# Patient Record
Sex: FEMALE | Race: WHITE | Employment: FULL TIME | URBAN - METROPOLITAN AREA
[De-identification: names, ages, dates, MRNs, and addresses within clinical notes are randomized per-mention and may not be internally consistent; named-entity substitution may affect disease eponyms.]

---

## 2016-10-04 LAB
CREATININE, EXTERNAL: 0.94
LDL-C, EXTERNAL: 112

## 2017-05-30 ENCOUNTER — OFFICE VISIT (OUTPATIENT)
Dept: ENDOCRINOLOGY | Age: 37
End: 2017-05-30

## 2017-05-30 VITALS
HEIGHT: 64 IN | HEART RATE: 77 BPM | WEIGHT: 183.5 LBS | DIASTOLIC BLOOD PRESSURE: 88 MMHG | OXYGEN SATURATION: 99 % | SYSTOLIC BLOOD PRESSURE: 130 MMHG | BODY MASS INDEX: 31.33 KG/M2 | TEMPERATURE: 97.3 F | RESPIRATION RATE: 18 BRPM

## 2017-05-30 DIAGNOSIS — E28.2 PCOS (POLYCYSTIC OVARIAN SYNDROME): Primary | ICD-10-CM

## 2017-05-30 DIAGNOSIS — E06.3 HASHIMOTO'S DISEASE: ICD-10-CM

## 2017-05-30 DIAGNOSIS — E53.8 VITAMIN B12 DEFICIENCY: ICD-10-CM

## 2017-05-30 DIAGNOSIS — E55.9 VITAMIN D DEFICIENCY: ICD-10-CM

## 2017-05-30 DIAGNOSIS — R73.9 HYPERGLYCEMIA: ICD-10-CM

## 2017-05-30 RX ORDER — NORETHINDRONE 5 MG/1
5 TABLET ORAL DAILY
COMMUNITY
End: 2019-07-15

## 2017-05-30 RX ORDER — LEVETIRACETAM 500 MG/1
TABLET ORAL 2 TIMES DAILY
COMMUNITY
End: 2021-02-24

## 2017-05-30 NOTE — MR AVS SNAPSHOT
Visit Information Date & Time Provider Department Dept. Phone Encounter #  
 5/30/2017  1:45 PM Yovanny Mueller MD [facility] Diabetes & Endocrinology [phone] [id number] Follow-up Instructions Return in about 6 months (around 11/30/2017). Upcoming Health Maintenance Date Due DTaP/Tdap/Td series (1 - Tdap) 10/14/2003 PAP AKA CERVICAL CYTOLOGY 10/14/2003 INFLUENZA AGE 9 TO ADULT 8/1/2017 Allergies as of 5/30/2017  Review Complete On: 5/30/2017 By: Yovanny Mueller MD  
 No Known Allergies Current Immunizations  Never Reviewed No immunizations on file. Not reviewed this visit You Were Diagnosed With   
  
 Codes Comments PCOS (polycystic ovarian syndrome)    -  Primary ICD-10-CM: E28.2 ICD-9-CM: 256.4 Hashimoto's disease     ICD-10-CM: E06.3 ICD-9-CM: 245.2 Hyperglycemia     ICD-10-CM: R73.9 ICD-9-CM: 790.29 Vitals BP Pulse Temp Resp Height(growth percentile) Weight(growth percentile) 130/88 (BP 1 Location: Right arm, BP Patient Position: Sitting) 77 97.3 °F (36.3 °C) (Oral) 18 5' 4\" (1.626 m) 183 lb 8 oz (83.2 kg) SpO2 BMI OB Status Smoking Status 99% 31.5 kg/m2 Having regular periods Never Smoker Vitals History BMI and BSA Data Body Mass Index Body Surface Area 31.5 kg/m 2 1.94 m 2 Preferred Pharmacy Pharmacy Name Phone [id number], [address] [facility] [phone] Your Updated Medication List  
  
   
This list is accurate as of: 5/30/17  2:39 PM.  Always use your most recent med list.  
  
  
  
  
 fenofibrate 160 mg tablet Commonly known as:  LOFIBRA Take 1 tablet by mouth daily. levETIRAcetam 500 mg tablet Commonly known as:  KEPPRA Take  by mouth two (2) times a day. levothyroxine 100 mcg tablet Commonly known as:  SYNTHROID Take 1 Tab by mouth Daily (before breakfast). Stop 88 mcg  
  
 losartan 100 mg tablet Commonly known as:  COZAAR  
TAKE 1 TABLET (100 MG) BY ORAL ROUTE ONCE DAILY FOR 30 DAYS  
  
 metFORMIN 500 mg tablet Commonly known as:  GLUCOPHAGE Take 1 Tab by mouth two (2) times daily (with meals). norethindrone acetate 5 mg tablet Commonly known as:  AYGESTIN Take 5 mg by mouth daily. venlafaxine 75 mg tablet Commonly known as:  EFFEXOR  
TAKE 1 TABLET (75 MG) BY ORAL ROUTE 2 TIMES PER DAY WITH FOOD FOR 30 DAYS Follow-up Instructions Return in about 6 months (around 11/30/2017). Introducing [facility] & HEALTH SERVICES! Dear Danica Calle: Thank you for requesting a c[employer] account. Our records indicate that you have previously registered for a cinvolve account but its currently inactive. Please call our cinvolve support line at [phone]. Additional Information If you have questions, please visit the Frequently Asked Questions section of the cinvolve website at [email or web address] Remember, cinvolve is NOT to be used for urgent needs. For medical emergencies, dial 911. Now available from your iPhone and Android! Please provide this summary of care documentation to your next provider. Your primary care clinician is listed as Jeane Steele. If you have any questions after today's visit, please call [phone].

## 2017-05-30 NOTE — PROGRESS NOTES
History of present illness    Dia Yoo is a 31 y.o. female  here for fu of  Hypothyroidism and PCOS    She is on LT4 consistently     Reports tiredness, insomnia  No weight gain     On progesterone only pill - has cycles monthly   On Metformin      No change in the size of the neck or neck pain. No dysphagia,dysphonia or dyspnea. No constipation or cold intolerance/heat intolerance  No history of known radiation exposure    No FH of thyroid disease. No FH of thyroid cancer     Past Medical History:   Diagnosis Date    Hashimoto's disease     Hypothyroid     PCOS (polycystic ovarian syndrome)     Seizure ([address] 12/2016     Wt Readings from Last 3 Encounters:   05/30/17 183 lb 8 oz (83.2 kg)   11/29/16 183 lb (83 kg)   10/25/16 177 lb 4.8 oz (80.4 kg)     Current Outpatient Prescriptions   Medication Sig    norethindrone acetate (AYGESTIN) 5 mg tablet Take 5 mg by mouth daily.  levETIRAcetam (KEPPRA) 500 mg tablet Take  by mouth two (2) times a day.  levothyroxine (SYNTHROID) 100 mcg tablet Take 1 Tab by mouth Daily (before breakfast). Stop 88 mcg    losartan (COZAAR) 100 mg tablet TAKE 1 TABLET (100 MG) BY ORAL ROUTE ONCE DAILY FOR 30 DAYS    venlafaxine (EFFEXOR) 75 mg tablet TAKE 1 TABLET (75 MG) BY ORAL ROUTE 2 TIMES PER DAY WITH FOOD FOR 30 DAYS    metFORMIN (GLUCOPHAGE) 500 mg tablet Take 1 Tab by mouth two (2) times daily (with meals).  fenofibrate (TRICOR) 160 mg tablet Take 1 tablet by mouth daily. No current facility-administered medications for this visit.           Review of Systems:  - Eyes: no blurry vision or double vision  - Cardiovascular: no chest pain or palpitations  - Respiratory: no cough or shortness of breath  - Gastrointestinal: no dysphagia or abdominal pain  - Musculoskeletal: no joint pains  - Integumentary: no rashes  - Neurological: no numbness, tingling, or headaches  -     Physical Examination:  - Blood pressure 130/88, pulse 77, temperature 97.3 °F (36.3 °C), temperature source Oral, resp. rate 18, height 5' 4\" (1.626 m), weight 183 lb 8 oz (83.2 kg), SpO2 99 %. Body mass index is 31.5 kg/(m^2). - General: pleasant, no distress, good eye contact  - HEENT: no periorbital edema, no scleral/conjunctival injection, EOMI, no lid lag or stare  - Neck: supple, no thyromegaly  - Cardiovascular: regular, normal rate, normal S1 and S2  - Respiratory: clear  - Gastrointestinal: soft, nontender, BS +  - Musculoskeletal:  no tremors, no edema  - Neurological: alert and oriented   - Psychiatric: normal mood and affect  - Skin - normal turgor    Data Reviewed:   1/13 TSH 21  Prolactin 14  FSH 5.5    Lab Results   Component Value Date/Time    TSH 2.960 05/23/2017 01:34 PM      Component      Latest Ref Rng 11/5/2013 9/9/2013   Luteinizing hormone        9.7   FSH        5.0   TSH      0.450 - 4.500 uIU/mL 3.660 0.189 (L)   Testosterone, Serum (Total)      10.0 - 55.0 ng/dL  44.0   Prolactin      4.8 - 23.3 ng/mL  9.8   Estradiol        46.0   DHEA Sulfate      98.8 - 340.0 ug/dL  129.2     Lab Results   Component Value Date/Time    Hemoglobin A1c 5.8 05/13/2015 10:36 AM       [x] Reviewed labs    Assessment/Plan:     1. PCOS (polycystic ovarian syndrome)    2. Hashimoto's disease    3. Hyperglycemia    4. Vitamin B12 deficiency    5. Vitamin D deficiency        Primary hypothyroidism /Hashimoto's thyroditis  Euthyroid. Annual neck exam.  US - no nodules 7/14  On LT4    HTN - controlled    PCOS /Prediabetes-  Hx of DVT - no OCPs    Metformin  Off Aldactone  On Provera      Insomnia - sleep hygeine    Hypertriglyceridemia - on tricor    Seizures - on keppra    Thank you for allowing me to participate in the care of this patient.     Abbie Landau, MD

## 2017-06-04 DIAGNOSIS — E06.3 HASHIMOTO'S DISEASE: ICD-10-CM

## 2017-06-04 RX ORDER — LEVOTHYROXINE SODIUM 100 UG/1
TABLET ORAL
Qty: 30 TAB | Refills: 5 | Status: SHIPPED | OUTPATIENT
Start: 2017-06-04 | End: 2017-12-05 | Stop reason: SDUPTHER

## 2017-11-29 LAB
25(OH)D3+25(OH)D2 SERPL-MCNC: 22.8 NG/ML (ref 30–100)
EST. AVERAGE GLUCOSE BLD GHB EST-MCNC: 120 MG/DL
HBA1C MFR BLD: 5.8 % (ref 4.8–5.6)
TSH SERPL DL<=0.005 MIU/L-ACNC: 5.11 UIU/ML (ref 0.45–4.5)
VIT B12 SERPL-MCNC: 319 PG/ML (ref 211–946)

## 2017-12-05 ENCOUNTER — OFFICE VISIT (OUTPATIENT)
Dept: ENDOCRINOLOGY | Age: 37
End: 2017-12-05

## 2017-12-05 VITALS
HEART RATE: 102 BPM | WEIGHT: 195.5 LBS | OXYGEN SATURATION: 99 % | DIASTOLIC BLOOD PRESSURE: 99 MMHG | RESPIRATION RATE: 14 BRPM | HEIGHT: 64 IN | BODY MASS INDEX: 33.38 KG/M2 | TEMPERATURE: 98.5 F | SYSTOLIC BLOOD PRESSURE: 148 MMHG

## 2017-12-05 DIAGNOSIS — E06.3 HYPOTHYROIDISM DUE TO HASHIMOTO'S THYROIDITIS: ICD-10-CM

## 2017-12-05 DIAGNOSIS — E28.2 PCOS (POLYCYSTIC OVARIAN SYNDROME): ICD-10-CM

## 2017-12-05 DIAGNOSIS — R73.03 PREDIABETES: ICD-10-CM

## 2017-12-05 DIAGNOSIS — E03.8 HYPOTHYROIDISM DUE TO HASHIMOTO'S THYROIDITIS: ICD-10-CM

## 2017-12-05 DIAGNOSIS — E06.3 HASHIMOTO'S DISEASE: Primary | ICD-10-CM

## 2017-12-05 DIAGNOSIS — E06.3 HASHIMOTO'S DISEASE: ICD-10-CM

## 2017-12-05 DIAGNOSIS — E28.2 PCOS (POLYCYSTIC OVARIAN SYNDROME): Primary | ICD-10-CM

## 2017-12-05 RX ORDER — LEVOTHYROXINE SODIUM 100 UG/1
TABLET ORAL
Qty: 34 TAB | Refills: 5 | Status: SHIPPED | OUTPATIENT
Start: 2017-12-05 | End: 2018-06-14 | Stop reason: SDUPTHER

## 2017-12-05 RX ORDER — METFORMIN HYDROCHLORIDE 500 MG/1
500 TABLET, EXTENDED RELEASE ORAL DAILY
Qty: 30 TAB | Refills: 5 | Status: SHIPPED | OUTPATIENT
Start: 2017-12-05 | End: 2019-07-15

## 2017-12-05 NOTE — MR AVS SNAPSHOT
Visit Information Date & Time Provider Department Dept. Phone Encounter #  
 12/5/2017  1:45 PM Cindy Lopes MD [facility] Diabetes & Endocrinology [phone] [id number] Follow-up Instructions Return in about 6 months (around 6/5/2018). Upcoming Health Maintenance Date Due DTaP/Tdap/Td series (1 - Tdap) 10/14/2003 PAP AKA CERVICAL CYTOLOGY 10/14/2003 Influenza Age 5 to Adult 8/1/2017 Allergies as of 12/5/2017  Review Complete On: 12/5/2017 By: Cindy Lopes MD  
 No Known Allergies Current Immunizations  Never Reviewed No immunizations on file. Not reviewed this visit You Were Diagnosed With   
  
 Codes Comments Hashimoto's disease    -  Primary ICD-10-CM: E06.3 ICD-9-CM: 245.2 PCOS (polycystic ovarian syndrome)     ICD-10-CM: E28.2 ICD-9-CM: 256.4 Vitals BP Pulse Temp Resp Height(growth percentile) Weight(growth percentile) (!) 148/99 (BP 1 Location: Left arm, BP Patient Position: Sitting) (!) 102 98.5 °F (36.9 °C) (Oral) 14 5' 4\" (1.626 m) 195 lb 8 oz (88.7 kg) SpO2 BMI OB Status Smoking Status 99% 33.56 kg/m2 Having regular periods Never Smoker Vitals History BMI and BSA Data Body Mass Index Body Surface Area  
 33.56 kg/m 2 2 m 2 Preferred Pharmacy Pharmacy Name Phone [id number], [address] [phone] Your Updated Medication List  
  
   
This list is accurate as of: 12/5/17  2:13 PM.  Always use your most recent med list.  
  
  
  
  
 fenofibrate 160 mg tablet Commonly known as:  LOFIBRA Take 1 tablet by mouth daily. levETIRAcetam 500 mg tablet Commonly known as:  KEPPRA Take  by mouth two (2) times a day. levothyroxine 100 mcg tablet Commonly known as:  SYNTHROID  
TAKE 1 TAB BY MOUTH DAILY (BEFORE BREAKFAST). STOP 88 MCG  
  
 losartan 100 mg tablet Commonly known as:  COZAAR  
 TAKE 1 TABLET (100 MG) BY ORAL ROUTE ONCE DAILY FOR 30 DAYS  
  
 metFORMIN 500 mg tablet Commonly known as:  GLUCOPHAGE Take 1 Tab by mouth two (2) times daily (with meals). norethindrone acetate 5 mg tablet Commonly known as:  AYGESTIN Take 5 mg by mouth daily. venlafaxine 75 mg tablet Commonly known as:  EFFEXOR  
TAKE 1 TABLET (37.5 MG) QHS Follow-up Instructions Return in about 6 months (around 6/5/2018). Patient Instructions Gluten-Free Diet: Care Instructions Your Care Instructions To help your symptoms, your doctor has recommended a gluten-free diet. This means not eating foods that have gluten in them. Gluten is a kind of protein. It's found in wheat, barley, and rye. If you eat a gluten-free diet, you can help manage your symptoms and prevent long-term problems. You can also get all the nutrition you need. Follow-up care is a key part of your treatment and safety. Be sure to make and go to all appointments, and call your doctor if you are having problems. It's also a good idea to know your test results and keep a list of the medicines you take. How can you care for yourself at home? · Don't eat any foods that have gluten in them. These include bagels, bread, crackers, and some cereals. They also include pasta and pizza. · Carefully read food labels. Look for wheat or wheat products in ice cream and candy. You may also find them in salad dressing, canned and frozen soups and vegetables, and other processed foods. · Avoid all beer products unless the label says they are gluten-free. Beers with and without alcohol have gluten unless the labels say they are gluten-free. This includes lagers, ales, and stouts. · Avoid oats, at least at first. Oats may cause symptoms in some people, perhaps as a result of contamination with wheat, barley, or rye during processing.  But many people who have celiac disease can eat moderate amounts of oats without having symptoms. Health professionals vary in their long-term recommendations regarding eating foods with oats. But most agree it is safe to eat oats labeled as gluten-free. · When you eat out, look for restaurants that serve gluten-free food. You can also ask if the  is familiar with gluten-free cooking. · Try to learn more about gluten-free options. Find grocery stores that sell gluten-free pizza and other foods. If you have access to the Internet, look online for gluten-free foods and recipes. · On a gluten-free eating plan, it's okay to have: 
¨ Eggs and dairy products. (But some dairy products may make your symptoms worse. Ask your doctor if you have questions about dairy products. Read ingredient labels carefully. Some processed cheeses contain gluten.) ¨ Flours and foods made with amaranth, arrowroot, beans, buckwheat, corn, cornmeal, flax, millet, potatoes, gluten-free nut and oat bran, quinoa, rice, sorghum, soybeans, tapioca, or teff. ¨ Fresh, frozen, or canned unprocessed meats. But avoid processed meats. Some examples of processed meats to avoid are hot dogs, salami, and deli meat. Read labels for additives that may contain gluten. ¨ Fresh, frozen, dried, or canned fruits and vegetables, if they do not have thickeners or other additives that contain gluten. ¨ Some alcohol drinks. These include wine, liqueurs, and ciders. They also include liquor like whiskey and nathaniel. When should you call for help? Watch closely for changes in your health, and be sure to contact your doctor if: 
? · You have unexplained weight loss. ? · You have diarrhea that lasts longer than 1 to 2 weeks. ? · You have unusual fatigue or mood changes, especially if these last more than a week and are not related to any other illness, such as the flu. ? · Your symptoms come back again. ? · Your stomach pain gets worse. Where can you learn more? Go to http://jesús-gloria.info/. Enter 31 41 19 in the search box to learn more about \"Gluten-Free Diet: Care Instructions. \" Current as of: May 12, 2017 Content Version: 11.4 © 1797-5473 [employer]. Care instructions adapted under license by [employer] (which disclaims liability or warranty for this information). If you have questions about a medical condition or this instruction, always ask your healthcare professional. [address] any warranty or liability for your use of this information. Introducing [facility] & HEALTH SERVICES! Dear Nana Kehr: Thank you for requesting a [employer] account. Our records indicate that you have previously registered for a [employer] account but its currently inactive. Please call our [employer] support line at [phone]. Additional Information If you have questions, please visit the Frequently Asked Questions section of the [employer] website at [email or web address] Remember, [employer] is NOT to be used for urgent needs. For medical emergencies, dial 911. Now available from your iPhone and Android! Please provide this summary of care documentation to your next provider. Your primary care clinician is listed as Danis Lujan. If you have any questions after today's visit, please call [phone].

## 2017-12-05 NOTE — PATIENT INSTRUCTIONS
Gluten-Free Diet: Care Instructions  Your Care Instructions    To help your symptoms, your doctor has recommended a gluten-free diet. This means not eating foods that have gluten in them. Gluten is a kind of protein. It's found in wheat, barley, and rye. If you eat a gluten-free diet, you can help manage your symptoms and prevent long-term problems. You can also get all the nutrition you need. Follow-up care is a key part of your treatment and safety. Be sure to make and go to all appointments, and call your doctor if you are having problems. It's also a good idea to know your test results and keep a list of the medicines you take. How can you care for yourself at home? · Don't eat any foods that have gluten in them. These include bagels, bread, crackers, and some cereals. They also include pasta and pizza. · Carefully read food labels. Look for wheat or wheat products in ice cream and candy. You may also find them in salad dressing, canned and frozen soups and vegetables, and other processed foods. · Avoid all beer products unless the label says they are gluten-free. Beers with and without alcohol have gluten unless the labels say they are gluten-free. This includes lagers, ales, and stouts. · Avoid oats, at least at first. Oats may cause symptoms in some people, perhaps as a result of contamination with wheat, barley, or rye during processing. But many people who have celiac disease can eat moderate amounts of oats without having symptoms. Health professionals vary in their long-term recommendations regarding eating foods with oats. But most agree it is safe to eat oats labeled as gluten-free. · When you eat out, look for restaurants that serve gluten-free food. You can also ask if the  is familiar with gluten-free cooking. · Try to learn more about gluten-free options. Find grocery stores that sell gluten-free pizza and other foods.  If you have access to the Internet, look online for gluten-free foods and recipes. · On a gluten-free eating plan, it's okay to have:  ¨ Eggs and dairy products. (But some dairy products may make your symptoms worse. Ask your doctor if you have questions about dairy products. Read ingredient labels carefully. Some processed cheeses contain gluten.)  ¨ Flours and foods made with amaranth, arrowroot, beans, buckwheat, corn, cornmeal, flax, millet, potatoes, gluten-free nut and oat bran, quinoa, rice, sorghum, soybeans, tapioca, or teff. ¨ Fresh, frozen, or canned unprocessed meats. But avoid processed meats. Some examples of processed meats to avoid are hot dogs, salami, and deli meat. Read labels for additives that may contain gluten. ¨ Fresh, frozen, dried, or canned fruits and vegetables, if they do not have thickeners or other additives that contain gluten. ¨ Some alcohol drinks. These include wine, liqueurs, and ciders. They also include liquor like whiskey and nathaniel. When should you call for help? Watch closely for changes in your health, and be sure to contact your doctor if:  ? · You have unexplained weight loss. ? · You have diarrhea that lasts longer than 1 to 2 weeks. ? · You have unusual fatigue or mood changes, especially if these last more than a week and are not related to any other illness, such as the flu. ? · Your symptoms come back again. ? · Your stomach pain gets worse. Where can you learn more? Go to http://jesús-gloria.info/. Enter 31 41 19 in the search box to learn more about \"Gluten-Free Diet: Care Instructions. \"  Current as of: May 12, 2017  Content Version: 11.4  © 9772-5298 [employer]. Care instructions adapted under license by [employer] (which disclaims liability or warranty for this information).  If you have questions about a medical condition or this instruction, always ask your healthcare professional. [place] [address] any warranty or liability for your use of this information.

## 2017-12-05 NOTE — PROGRESS NOTES
Urban Flores is a 30 y.o. female here for   Chief Complaint   Patient presents with    PCOS    Thyroid Problem       1. Have you been to the ER, urgent care clinic since your last visit? Hospitalized since your last visit? - no    2. Have you seen or consulted any other health care providers outside of the [address] [place] [address] since your last visit? Include any pap smears or colon screening. -PCP    Wt Readings from Last 3 Encounters:   05/30/17 183 lb 8 oz (83.2 kg)   11/29/16 183 lb (83 kg)   10/25/16 177 lb 4.8 oz (80.4 kg)     Temp Readings from Last 3 Encounters:   05/30/17 97.3 °F (36.3 °C) (Oral)   11/29/16 97.9 °F (36.6 °C) (Oral)   10/25/16 97.8 °F (36.6 °C) (Oral)     BP Readings from Last 3 Encounters:   05/30/17 130/88   11/29/16 114/73   10/25/16 139/89     Pulse Readings from Last 3 Encounters:   05/30/17 77   11/29/16 87   10/25/16 76

## 2017-12-05 NOTE — PROGRESS NOTES
History of present illness    Luzma Choi is a 30 y.o. female  here for fu of  Hypothyroidism and PCOS    She is on LT4 consistently     Reports tiredness  Diet is unhealthy and admits to drinking too many sodas   Insomnia at night . On progesterone only pill - has cycles monthly   off Metformin due to GI side effects     No history of known radiation exposure    No FH of thyroid disease. No FH of thyroid cancer     Wt Readings from Last 3 Encounters:   12/05/17 195 lb 8 oz (88.7 kg)   05/30/17 183 lb 8 oz (83.2 kg)   11/29/16 183 lb (83 kg)       Past Medical History:   Diagnosis Date    Hashimoto's disease     Hypothyroid     PCOS (polycystic ovarian syndrome)     Seizure ([address] 12/2016     Wt Readings from Last 3 Encounters:   12/05/17 195 lb 8 oz (88.7 kg)   05/30/17 183 lb 8 oz (83.2 kg)   11/29/16 183 lb (83 kg)     Current Outpatient Prescriptions   Medication Sig    levothyroxine (SYNTHROID) 100 mcg tablet TAKE 1 TAB BY MOUTH DAILY (BEFORE BREAKFAST). STOP 88 MCG    norethindrone acetate (AYGESTIN) 5 mg tablet Take 5 mg by mouth daily.  levETIRAcetam (KEPPRA) 500 mg tablet Take  by mouth two (2) times a day.  losartan (COZAAR) 100 mg tablet TAKE 1 TABLET (100 MG) BY ORAL ROUTE ONCE DAILY FOR 30 DAYS    venlafaxine (EFFEXOR) 75 mg tablet TAKE 1 TABLET (37.5 MG) QHS    metFORMIN (GLUCOPHAGE) 500 mg tablet Take 1 Tab by mouth two (2) times daily (with meals).  fenofibrate (TRICOR) 160 mg tablet Take 1 tablet by mouth daily. No current facility-administered medications for this visit.           Review of Systems:  - Eyes: no blurry vision or double vision  - Cardiovascular: no chest pain or palpitations  - Respiratory: no cough or shortness of breath  - Gastrointestinal: no dysphagia or abdominal pain  - Musculoskeletal: no joint pains  - Integumentary: no rashes  - Neurological: no numbness, tingling, or headaches  -     Physical Examination:  - Blood pressure (!) 148/99, pulse (!) 102, temperature 98.5 °F (36.9 °C), temperature source Oral, resp. rate 14, height 5' 4\" (1.626 m), weight 195 lb 8 oz (88.7 kg), SpO2 99 %. Body mass index is 33.56 kg/(m^2). - General: pleasant, no distress, good eye contact  - HEENT: no periorbital edema, no scleral/conjunctival injection, EOMI, no lid lag or stare  - Neck: supple, no thyromegaly  - Cardiovascular: regular, normal rate, normal S1 and S2  - Respiratory: clear  - Gastrointestinal: soft, nontender, BS +  - Musculoskeletal:  no tremors, no edema  - Neurological: alert and oriented   - Psychiatric: normal mood and affect  - Skin - normal turgor    Data Reviewed:   1/13 TSH 21  Prolactin 14  FSH 5.5    Lab Results   Component Value Date/Time    TSH 5.110 11/28/2017 12:00 AM      Component      Latest Ref Rng 11/5/2013 9/9/2013   Luteinizing hormone        9.7   FSH        5.0   TSH      0.450 - 4.500 uIU/mL 3.660 0.189 (L)   Testosterone, Serum (Total)      10.0 - 55.0 ng/dL  44.0   Prolactin      4.8 - 23.3 ng/mL  9.8   Estradiol        46.0   DHEA Sulfate      98.8 - 340.0 ug/dL  129.2     Lab Results   Component Value Date/Time    Hemoglobin A1c 5.8 11/28/2017 12:00 AM       [x] Reviewed labs    Assessment/Plan:     1. Hashimoto's disease    2. PCOS (polycystic ovarian syndrome)        Primary hypothyroidism /Hashimoto's thyroditis    US - no nodules 7/14  On LT4 - compliance discussed    HTN - controlled    PCOS /Prediabetes-  Hx of DVT - no OCPs    Metformin - diarrhea, change to ER  Off Aldactone  On Provera        Insomnia - sleep hygeine, quit sodas     Hypertriglyceridemia - on tricor    Seizures - on keppra    Thank you for allowing me to participate in the care of this patient.     Alma Glass MD

## 2018-02-19 ENCOUNTER — OFFICE VISIT (OUTPATIENT)
Dept: ENDOCRINOLOGY | Age: 38
End: 2018-02-19

## 2018-02-19 VITALS
RESPIRATION RATE: 14 BRPM | WEIGHT: 190.7 LBS | HEART RATE: 93 BPM | DIASTOLIC BLOOD PRESSURE: 108 MMHG | HEIGHT: 64 IN | TEMPERATURE: 97.4 F | BODY MASS INDEX: 32.56 KG/M2 | SYSTOLIC BLOOD PRESSURE: 154 MMHG | OXYGEN SATURATION: 99 %

## 2018-02-19 DIAGNOSIS — E06.3 HYPOTHYROIDISM DUE TO HASHIMOTO'S THYROIDITIS: ICD-10-CM

## 2018-02-19 DIAGNOSIS — E03.8 HYPOTHYROIDISM DUE TO HASHIMOTO'S THYROIDITIS: ICD-10-CM

## 2018-02-19 DIAGNOSIS — E28.2 PCOS (POLYCYSTIC OVARIAN SYNDROME): ICD-10-CM

## 2018-02-19 DIAGNOSIS — K21.9 GASTROESOPHAGEAL REFLUX DISEASE WITHOUT ESOPHAGITIS: ICD-10-CM

## 2018-02-19 DIAGNOSIS — E06.3 HASHIMOTO'S DISEASE: Primary | ICD-10-CM

## 2018-02-19 RX ORDER — LIDOCAINE HYDROCHLORIDE 20 MG/ML
SOLUTION ORAL; TOPICAL
COMMUNITY
Start: 2018-02-16 | End: 2020-11-17

## 2018-02-19 RX ORDER — ALUMINA, MAGNESIA, AND SIMETHICONE 2400; 2400; 240 MG/30ML; MG/30ML; MG/30ML
10 SUSPENSION ORAL
Qty: 150 ML | Refills: 0 | Status: SHIPPED | OUTPATIENT
Start: 2018-02-19 | End: 2018-08-27

## 2018-02-19 RX ORDER — RANITIDINE 150 MG/1
150 TABLET, FILM COATED ORAL 2 TIMES DAILY
Qty: 60 TAB | Refills: 5 | Status: SHIPPED | OUTPATIENT
Start: 2018-02-19 | End: 2018-08-27

## 2018-02-19 NOTE — PROGRESS NOTES
History of present illness    Elli Ledesma is a 30 y.o. female  here for fu of  Hypothyroidism and PCOS    She is on LT4 consistently   She complains of foreign body sensation, painful swallowing, sore throat, she was in the ER was negative. Chronic smoker, recently stopped sodas  No fever,  chills,   No sick contacts  No recent steroids  Reports tiredness    On progesterone only pill - has cycles monthly   off Metformin due to GI side effects     No history of known radiation exposure    No FH of thyroid disease. No FH of thyroid cancer     Wt Readings from Last 3 Encounters:   02/19/18 190 lb 11.2 oz (86.5 kg)   12/05/17 195 lb 8 oz (88.7 kg)   05/30/17 183 lb 8 oz (83.2 kg)       Past Medical History:   Diagnosis Date    Hashimoto's disease     Hypothyroid     PCOS (polycystic ovarian syndrome)     Seizure ([facility] [address] 12/2016     Wt Readings from Last 3 Encounters:   02/19/18 190 lb 11.2 oz (86.5 kg)   12/05/17 195 lb 8 oz (88.7 kg)   05/30/17 183 lb 8 oz (83.2 kg)     Current Outpatient Prescriptions   Medication Sig    levothyroxine (SYNTHROID) 100 mcg tablet TAKE 1 TAB BY MOUTH DAILY (BEFORE BREAKFAST) MON-FRI. TAKE 2 TABS ON SUNDAYS. STOP 88 MCG    metFORMIN ER (GLUCOPHAGE XR) 500 mg tablet Take 1 Tab by mouth daily.  norethindrone acetate (AYGESTIN) 5 mg tablet Take 5 mg by mouth daily.  levETIRAcetam (KEPPRA) 500 mg tablet Take  by mouth two (2) times a day.  losartan (COZAAR) 100 mg tablet TAKE 1 TABLET (100 MG) BY ORAL ROUTE ONCE DAILY FOR 30 DAYS    venlafaxine (EFFEXOR) 75 mg tablet TAKE 1 TABLET (37.5 MG) QHS    LIDOCAINE VISCOUS 2 % solution     fenofibrate (TRICOR) 160 mg tablet Take 1 tablet by mouth daily. No current facility-administered medications for this visit.           Review of Systems:  - Eyes: no blurry vision or double vision  - Cardiovascular: no chest pain or palpitations  - Respiratory: no cough or shortness of breath  - Gastrointestinal: + Dysphagia or abdominal pain  - Musculoskeletal: no joint pains  - Integumentary: no rashes  - Neurological: no numbness, tingling, or headaches  -     Physical Examination:  - Blood pressure (!) 154/108, pulse 93, temperature 97.4 °F (36.3 °C), temperature source Oral, resp. rate 14, height 5' 4\" (1.626 m), weight 190 lb 11.2 oz (86.5 kg), SpO2 99 %. Body mass index is 32.73 kg/(m^2). - General: pleasant, no distress, good eye contact  - HEENT: no periorbital edema, no scleral/conjunctival injection, EOMI, no lid lag or stare  - Neck:  could not palpate thyromegaly, severe gag reflex  - Cardiovascular: regular, normal rate, normal S1 and S2  - Respiratory: clear  - Gastrointestinal: soft, nontender, BS +  - Musculoskeletal:  no tremors, no edema  - Neurological: alert and oriented   - Psychiatric: normal mood and affect  - Skin - normal turgor    Data Reviewed:   1/13 TSH 21  Prolactin 14  FSH 5.5    Lab Results   Component Value Date/Time    TSH 5.110 (H) 11/28/2017 12:00 AM      Component      Latest Ref Rng 11/5/2013 9/9/2013   Luteinizing hormone        9.7   FSH        5.0   TSH      0.450 - 4.500 uIU/mL 3.660 0.189 (L)   Testosterone, Serum (Total)      10.0 - 55.0 ng/dL  44.0   Prolactin      4.8 - 23.3 ng/mL  9.8   Estradiol        46.0   DHEA Sulfate      98.8 - 340.0 ug/dL  129.2     Lab Results   Component Value Date/Time    Hemoglobin A1c 5.8 (H) 11/28/2017 12:00 AM       [x] Reviewed labs    Assessment/Plan:     1. Hashimoto's disease    2. Hypothyroidism due to Hashimoto's thyroiditis    3.  PCOS (polycystic ovarian syndrome)        Odynophagia:  Was evaluated in ER recently, strep negative  She had x-ray in the ER, may be looking for epiglottitis  GERD, thyroiditis  Maalox, Zantac  GI evaluation      Primary hypothyroidism /Hashimoto's thyroditis    US - no nodules 7/14  On LT4 - compliance discussed    HTN - uncontrolled    PCOS /Prediabetes-  Hx of DVT - no OCPs    Metformin - diarrhea, change to ER  Off Aldactone  On Provera        Insomnia - sleep hygeine, quit sodas     Hypertriglyceridemia - on tricor    Seizures - on keppra    Thank you for allowing me to participate in the care of this patient.     Katie Givens MD

## 2018-02-19 NOTE — MR AVS SNAPSHOT
[address] [facility] [address] [place] [address] 
[phone] Patient: Iam He MRN: [id number] :1980 Visit Information Date & Time Provider Department Dept. Phone Encounter #  
 2018  1:30 PM Julia Velez MD [facility] [phone] [id number] Follow-up Instructions Return in about 6 months (around 2018). Your Appointments 2018 10:30 AM  
LAB with CDE NURSE [facility]) Appt Note: lab only [address] [facility] [address] [facility] [address]  
[phone]  
  
   
 [address] [id number]  
  
    
 2018  1:45 PM  
ROUTINE CARE with Julia Velez MD  
[facility]) Appt Note: f/u 6 month  
 [address] [place] [address]  
[phone]  
  
   
 [address] [place] [address] Upcoming Health Maintenance Date Due DTaP/Tdap/Td series (1 - Tdap) 10/14/2003 PAP AKA CERVICAL CYTOLOGY 10/14/2003 Influenza Age 5 to Adult 2017 Allergies as of 2018  Review Complete On: 2018 By: Julia Velez MD  
 No Known Allergies Current Immunizations  Never Reviewed No immunizations on file. Not reviewed this visit You Were Diagnosed With   
  
 Codes Comments Hashimoto's disease    -  Primary ICD-10-CM: E06.3 ICD-9-CM: 245.2 Hypothyroidism due to Hashimoto's thyroiditis     ICD-10-CM: E03.8, E06.3 ICD-9-CM: 244.8, 245.2 PCOS (polycystic ovarian syndrome)     ICD-10-CM: E28.2 ICD-9-CM: 256.4 Vitals BP Pulse Temp Resp Height(growth percentile) Weight(growth percentile) (!) 154/108 (BP 1 Location: Right arm, BP Patient Position: Sitting) 93 97.4 °F (36.3 °C) (Oral) 14 5' 4\" (1.626 m) 190 lb 11.2 oz (86.5 kg) SpO2 BMI OB Status Smoking Status 99% 32.73 kg/m2 Having regular periods Never Smoker Vitals History BMI and BSA Data Body Mass Index Body Surface Area 32.73 kg/m 2 1.98 m 2 Preferred Pharmacy Pharmacy Name Phone [id number] [address] [facility] [phone] Your Updated Medication List  
  
   
This list is accurate as of: 2/19/18  2:05 PM.  Always use your most recent med list.  
  
  
  
  
 fenofibrate 160 mg tablet Commonly known as:  LOFIBRA Take 1 tablet by mouth daily. levETIRAcetam 500 mg tablet Commonly known as:  KEPPRA Take  by mouth two (2) times a day. levothyroxine 100 mcg tablet Commonly known as:  SYNTHROID  
TAKE 1 TAB BY MOUTH DAILY (BEFORE BREAKFAST) MON-FRI. TAKE 2 TABS ON SUNDAYS. STOP 88 MCG  
  
 LIDOCAINE VISCOUS 2 % solution Generic drug:  lidocaine  
  
 losartan 100 mg tablet Commonly known as:  COZAAR  
TAKE 1 TABLET (100 MG) BY ORAL ROUTE ONCE DAILY FOR 30 DAYS  
  
 metFORMIN  mg tablet Commonly known as:  GLUCOPHAGE XR Take 1 Tab by mouth daily. norethindrone acetate 5 mg tablet Commonly known as:  AYGESTIN Take 5 mg by mouth daily. venlafaxine 75 mg tablet Commonly known as:  EFFEXOR  
TAKE 1 TABLET (37.5 MG) QHS Follow-up Instructions Return in about 6 months (around 8/19/2018). Introducing [facility]! Dear Beni Posada: Thank you for requesting a RxRev[employer] account. Our records indicate that you have previously registered for a RxRevu account but its currently inactive. Please call our RxRevu support line at [phone]. Additional Information If you have questions, please visit the Frequently Asked Questions section of the RxRevu website at [email or web address] Remember, RxRevu is NOT to be used for urgent needs. For medical emergencies, dial 911. Now available from your iPhone and Android! Please provide this summary of care documentation to your next provider. Your primary care clinician is listed as Estlata Law. If you have any questions after today's visit, please call [phone].

## 2018-02-19 NOTE — PROGRESS NOTES
Clinton Gonzalez is a 30 y.o. female here for   Chief Complaint   Patient presents with    Thyroid Problem     1. Have you been to the ER, urgent care clinic since your last visit? Hospitalized since your last visit? -[place] on Friday for choking sensation    2. Have you seen or consulted any other health care providers outside of the [address] [place] [address] since your last visit?   Include any pap smears or colon screening.-no      Lab Results   Component Value Date/Time    Hemoglobin A1c 5.8 (H) 11/28/2017 12:00 AM       Wt Readings from Last 3 Encounters:   12/05/17 195 lb 8 oz (88.7 kg)   05/30/17 183 lb 8 oz (83.2 kg)   11/29/16 183 lb (83 kg)     Temp Readings from Last 3 Encounters:   12/05/17 98.5 °F (36.9 °C) (Oral)   05/30/17 97.3 °F (36.3 °C) (Oral)   11/29/16 97.9 °F (36.6 °C) (Oral)     BP Readings from Last 3 Encounters:   12/05/17 (!) 148/99   05/30/17 130/88   11/29/16 114/73     Pulse Readings from Last 3 Encounters:   12/05/17 (!) 102   05/30/17 77   11/29/16 87

## 2018-02-20 LAB
ERYTHROCYTE [SEDIMENTATION RATE] IN BLOOD BY WESTERGREN METHOD: 6 MM/HR (ref 0–32)
T4 FREE SERPL-MCNC: 1.6 NG/DL (ref 0.82–1.77)
TSH SERPL DL<=0.005 MIU/L-ACNC: 1.78 UIU/ML (ref 0.45–4.5)

## 2018-06-14 DIAGNOSIS — E06.3 HASHIMOTO'S DISEASE: ICD-10-CM

## 2018-06-15 RX ORDER — LEVOTHYROXINE SODIUM 100 UG/1
TABLET ORAL
Qty: 34 TAB | Refills: 5 | Status: SHIPPED | OUTPATIENT
Start: 2018-06-15 | End: 2018-12-03 | Stop reason: SDUPTHER

## 2018-08-27 ENCOUNTER — OFFICE VISIT (OUTPATIENT)
Dept: ENDOCRINOLOGY | Age: 38
End: 2018-08-27

## 2018-08-27 VITALS
RESPIRATION RATE: 14 BRPM | HEIGHT: 64 IN | TEMPERATURE: 97.5 F | WEIGHT: 194 LBS | BODY MASS INDEX: 33.12 KG/M2 | SYSTOLIC BLOOD PRESSURE: 156 MMHG | HEART RATE: 84 BPM | OXYGEN SATURATION: 98 % | DIASTOLIC BLOOD PRESSURE: 83 MMHG

## 2018-08-27 DIAGNOSIS — E28.2 PCOS (POLYCYSTIC OVARIAN SYNDROME): ICD-10-CM

## 2018-08-27 DIAGNOSIS — R73.9 HYPERGLYCEMIA: ICD-10-CM

## 2018-08-27 DIAGNOSIS — R74.01 TRANSAMINITIS: ICD-10-CM

## 2018-08-27 DIAGNOSIS — E06.3 HASHIMOTO'S DISEASE: Primary | ICD-10-CM

## 2018-08-27 RX ORDER — OMEPRAZOLE 40 MG/1
40 CAPSULE, DELAYED RELEASE ORAL AS NEEDED
COMMUNITY
End: 2021-02-24

## 2018-08-27 NOTE — MR AVS SNAPSHOT
[address] [place] [address] 
[phone] Patient: Karen Hopkins MRN: [id number] :1980 Visit Information Date & Time Provider Department Dept. Phone Encounter #  
 2018  3:45 PM Laura Faulkner MD [facility] [phone] [id number] Upcoming Health Maintenance Date Due DTaP/Tdap/Td series (1 - Tdap) 10/14/2003 PAP AKA CERVICAL CYTOLOGY 10/14/2003 Influenza Age 5 to Adult 2018 Allergies as of 2018  Review Complete On: 2018 By: Laura Faulkner MD  
 No Known Allergies Current Immunizations  Never Reviewed No immunizations on file. Not reviewed this visit You Were Diagnosed With   
  
 Codes Comments Hashimoto's disease    -  Primary ICD-10-CM: E06.3 ICD-9-CM: 245.2 PCOS (polycystic ovarian syndrome)     ICD-10-CM: E28.2 ICD-9-CM: 256.4 Vitals BP Pulse Temp Resp Height(growth percentile) Weight(growth percentile) 156/83 (BP 1 Location: Left arm, BP Patient Position: Sitting) 84 97.5 °F (36.4 °C) (Oral) 14 5' 4\" (1.626 m) 194 lb (88 kg) SpO2 BMI OB Status Smoking Status 98% 33.3 kg/m2 Having regular periods Never Smoker Vitals History BMI and BSA Data Body Mass Index Body Surface Area  
 33.3 kg/m 2 1.99 m 2 Preferred Pharmacy Pharmacy Name Phone [id number] [facility] [phone] Your Updated Medication List  
  
   
This list is accurate as of 18  4:11 PM.  Always use your most recent med list.  
  
  
  
  
 fenofibrate 160 mg tablet Commonly known as:  LOFIBRA Take 1 tablet by mouth daily. levETIRAcetam 500 mg tablet Commonly known as:  KEPPRA Take  by mouth two (2) times a day. levothyroxine 100 mcg tablet Commonly known as:  SYNTHROID  
TAKE 1 TAB BY MOUTH DAILY (BEFORE BREAKFAST) MON-FRI.  TAKE 2 TABS ON SUNDAYS. STOP 88 MCG  
  
 LIDOCAINE VISCOUS 2 % solution Generic drug:  lidocaine  
  
 losartan 100 mg tablet Commonly known as:  COZAAR  
TAKE 1 TABLET (100 MG) BY ORAL ROUTE ONCE DAILY FOR 30 DAYS  
  
 metFORMIN  mg tablet Commonly known as:  GLUCOPHAGE XR Take 1 Tab by mouth daily. norethindrone acetate 5 mg tablet Commonly known as:  AYGESTIN Take 5 mg by mouth daily. omeprazole 40 mg capsule Commonly known as:  PRILOSEC Take 40 mg by mouth as needed. venlafaxine 75 mg tablet Commonly known as:  EFFEXOR  
TAKE 1 TABLET (37.5 MG) QHS Introducing [facility]! Dear Quoc Armas: Thank you for requesting a [employer] account. Our records indicate that you have previously registered for a Online[employer] account but its currently inactive. Please call our [employer] support line at [phone]. Additional Information If you have questions, please visit the Frequently Asked Questions section of the [employer] website at [email or web address] Remember, OnlineMarket is NOT to be used for urgent needs. For medical emergencies, dial 911. Now available from your iPhone and Android! Please provide this summary of care documentation to your next provider. Your primary care clinician is listed as Saúl Gupta. If you have any questions after today's visit, please call [phone].

## 2018-08-27 NOTE — PROGRESS NOTES
History of present illness    Yuki Good is a 30 y.o. female  here for fu of  Hypothyroidism and PCOS    She is on LT4 consistently   She has seen ENT, for odynophagia, PPI helped,   Zantac did not help    Interested in weight loss    On progesterone only pill - has cycles monthly   off Metformin due to GI side effects     No history of known radiation exposure    No FH of thyroid disease. No FH of thyroid cancer     Wt Readings from Last 3 Encounters:   08/27/18 194 lb (88 kg)   02/19/18 190 lb 11.2 oz (86.5 kg)   12/05/17 195 lb 8 oz (88.7 kg)       Past Medical History:   Diagnosis Date    Hashimoto's disease     Hypothyroid     PCOS (polycystic ovarian syndrome)     Seizure ([facility] [address] 12/2016     Wt Readings from Last 3 Encounters:   08/27/18 194 lb (88 kg)   02/19/18 190 lb 11.2 oz (86.5 kg)   12/05/17 195 lb 8 oz (88.7 kg)     Current Outpatient Prescriptions   Medication Sig    omeprazole (PRILOSEC) 40 mg capsule Take 40 mg by mouth as needed.  levothyroxine (SYNTHROID) 100 mcg tablet TAKE 1 TAB BY MOUTH DAILY (BEFORE BREAKFAST) MON-FRI. TAKE 2 TABS ON SUNDAYS. STOP 88 MCG (Patient taking differently: TAKE 1 TAB BY MOUTH DAILY (BEFORE BREAKFAST) MON-SAT TAKE 2 TABS ON SUNDAYS. STOP 88 MCG)    LIDOCAINE VISCOUS 2 % solution     norethindrone acetate (AYGESTIN) 5 mg tablet Take 5 mg by mouth daily.  levETIRAcetam (KEPPRA) 500 mg tablet Take  by mouth two (2) times a day.  losartan (COZAAR) 100 mg tablet TAKE 1 TABLET (100 MG) BY ORAL ROUTE ONCE DAILY FOR 30 DAYS    venlafaxine (EFFEXOR) 75 mg tablet TAKE 1 TABLET (37.5 MG) QHS    aluminum & magnesium hydroxide-simethicone (MYLANTA II) 400-400-40 mg/5 mL suspension Take 10 mL by mouth every six (6) hours as needed for Indigestion.  raNITIdine (ZANTAC) 150 mg tablet Take 1 Tab by mouth two (2) times a day.  metFORMIN ER (GLUCOPHAGE XR) 500 mg tablet Take 1 Tab by mouth daily.     fenofibrate (TRICOR) 160 mg tablet Take 1 tablet by mouth daily. No current facility-administered medications for this visit. Review of Systems:  - Eyes: no blurry vision or double vision  - Cardiovascular: no chest pain or palpitations  - Respiratory: no cough or shortness of breath  - Gastrointestinal: no  abdominal pain  - Musculoskeletal: no joint pains  - Integumentary: no rashes  - Neurological: no numbness, tingling, or headaches  -     Physical Examination:  - Blood pressure 156/83, pulse 84, temperature 97.5 °F (36.4 °C), temperature source Oral, resp. rate 14, height 5' 4\" (1.626 m), weight 194 lb (88 kg), SpO2 98 %. Body mass index is 33.3 kg/(m^2). - General: pleasant, no distress, good eye contact  - HEENT: no periorbital edema, no scleral/conjunctival injection, EOMI, no lid lag or stare  - Neck:  could not palpate thyromegaly, severe gag reflex  - Cardiovascular: regular, normal rate, normal S1 and S2  - Respiratory: clear  - Gastrointestinal: soft, nontender, BS +  - Musculoskeletal:  no tremors, no edema  - Neurological: alert and oriented   - Psychiatric: normal mood and affect  - Skin - normal turgor    Data Reviewed:   1/13 TSH 21  Prolactin 14  FSH 5.5    Lab Results   Component Value Date/Time    TSH 1.780 02/19/2018 02:15 PM    T4, Free 1.60 02/19/2018 02:15 PM      Component      Latest Ref Rng 11/5/2013 9/9/2013   Luteinizing hormone        9.7   FSH        5.0   TSH      0.450 - 4.500 uIU/mL 3.660 0.189 (L)   Testosterone, Serum (Total)      10.0 - 55.0 ng/dL  44.0   Prolactin      4.8 - 23.3 ng/mL  9.8   Estradiol        46.0   DHEA Sulfate      98.8 - 340.0 ug/dL  129.2     Lab Results   Component Value Date/Time    Hemoglobin A1c 5.8 (H) 08/20/2018 03:27 PM       [x] Reviewed labs    Assessment/Plan:     1. Hashimoto's disease    2.  PCOS (polycystic ovarian syndrome)        GERD-seen ENT  PPI helped      Primary hypothyroidism /Hashimoto's thyroditis    US - no nodules 7/14  On LT4 - compliance discussed  Need labs today, TSH    HTN - uncontrolled    PCOS /Prediabetes-  Hx of DVT - no OCPs    Metformin - diarrhea, change to ER  Off Aldactone  On Provera    Transaminitis could be Hamilton Manuel-, ultrasound of the abdomen      Hypertriglyceridemia - on tricor    Seizures - on keppra    Thank you for allowing me to participate in the care of this patient. Teodoro Boss MD      Patient verbalized understanding    Voice-recognition software was used to generate this report, which may result in some phonetic-based errors in the grammar and contents. Even though attempts were made to correct all the mistakes, some may have been missed and remained in the body of the report.

## 2018-08-27 NOTE — PROGRESS NOTES
John Padgett is a 30 y.o. female here for   Chief Complaint   Patient presents with    Thyroid Problem    PCOS       1. Have you been to the ER, urgent care clinic since your last visit? Hospitalized since your last visit? -no    2. Have you seen or consulted any other health care providers outside of the [address] [place] [address] since your last visit?   Include any pap smears or colon screening.-no

## 2018-08-28 ENCOUNTER — TELEPHONE (OUTPATIENT)
Dept: ENDOCRINOLOGY | Age: 38
End: 2018-08-28

## 2018-08-28 LAB
T4 FREE SERPL-MCNC: 1.57 NG/DL (ref 0.82–1.77)
TSH SERPL DL<=0.005 MIU/L-ACNC: 2.48 UIU/ML (ref 0.45–4.5)

## 2018-08-28 NOTE — TELEPHONE ENCOUNTER
----- Message from Manjula Russell sent at 8/28/2018  1:12 PM EDT -----  Regarding: Dr. Anusha Hobson  Pt returning miss call from the nurse. Inquiring the reason for the call. Pt stated, she is at work now.    Best time to call would be after 4 PM.  Best contact number [phone]

## 2018-08-28 NOTE — TELEPHONE ENCOUNTER
Attempted to call. Unsuccessful. Left msg for Oral Gails to give us a call back at the office. A callback number was left.

## 2018-08-28 NOTE — PROGRESS NOTES
Thyroid test is normal 
Please update the [facility] she is taking levothyroxine 1 tablet every day, 2 tablets on Sunday

## 2018-08-28 NOTE — TELEPHONE ENCOUNTER
Per Dr. Leslie Blanco, informed pt of result note, as noted above. Pt verbalized understanding with no further questions or concerns at this time.

## 2018-08-28 NOTE — TELEPHONE ENCOUNTER
----- Message from Fili Hodgson MD sent at 8/28/2018 12:45 PM EDT -----  Thyroid test is normal  Please update the [facility] she is taking levothyroxine 1 tablet every day, 2 tablets on Sunday

## 2018-08-30 ENCOUNTER — HOSPITAL ENCOUNTER (OUTPATIENT)
Dept: ULTRASOUND IMAGING | Age: 38
Discharge: HOME | End: 2018-08-30
Attending: INTERNAL MEDICINE
Payer: MEDICAID

## 2018-08-30 DIAGNOSIS — R74.01 TRANSAMINITIS: ICD-10-CM

## 2018-08-30 DIAGNOSIS — E28.2 PCOS (POLYCYSTIC OVARIAN SYNDROME): ICD-10-CM

## 2018-08-30 DIAGNOSIS — R73.9 HYPERGLYCEMIA: ICD-10-CM

## 2018-08-30 DIAGNOSIS — E06.3 HASHIMOTO'S DISEASE: ICD-10-CM

## 2018-08-30 PROCEDURE — 76705 ECHO EXAM OF ABDOMEN: CPT

## 2018-09-01 NOTE — PROGRESS NOTES
She has fatty liver as suspected  Also has gallbladder stones, they noted that you had some pain when they did ultrasound , gallbladder stones can cause pain on the right side of the abdomen, nausea, vomiting, in that case they have to take out gallbladder.   To discuss with Dr. Ana Laura Flor  Fax copy to Dr. Ritika Villatoro with PCP

## 2018-09-04 ENCOUNTER — TELEPHONE (OUTPATIENT)
Dept: ENDOCRINOLOGY | Age: 38
End: 2018-09-04

## 2018-09-04 NOTE — TELEPHONE ENCOUNTER
Per Dr. Francy Pena, informed pt of result note, as noted above. Pt verbalized understanding with no further questions or concerns at this time. Copy faxed to PCP. Confirmation received.

## 2018-09-04 NOTE — TELEPHONE ENCOUNTER
----- Message from Lovely Villatoro MD sent at 9/1/2018  3:00 PM EDT -----  She has fatty liver as suspected  Also has gallbladder stones, they noted that you had some pain when they did ultrasound , gallbladder stones can cause pain on the right side of the abdomen, nausea, vomiting, in that case they have to take out gallbladder.   To discuss with Dr. Kimberly Box  Fax copy to Dr. Jordan Brown with PCP

## 2018-12-03 DIAGNOSIS — E06.3 HASHIMOTO'S DISEASE: ICD-10-CM

## 2018-12-03 RX ORDER — LEVOTHYROXINE SODIUM 100 UG/1
TABLET ORAL
Qty: 34 TAB | Refills: 5 | Status: SHIPPED | OUTPATIENT
Start: 2018-12-03 | End: 2019-06-22 | Stop reason: SDUPTHER

## 2019-06-22 DIAGNOSIS — E06.3 HASHIMOTO'S DISEASE: ICD-10-CM

## 2019-06-22 RX ORDER — LEVOTHYROXINE SODIUM 100 UG/1
TABLET ORAL
Qty: 30 TAB | Refills: 6 | Status: SHIPPED | OUTPATIENT
Start: 2019-06-22 | End: 2019-07-15 | Stop reason: SDUPTHER

## 2019-06-24 DIAGNOSIS — E06.3 HASHIMOTO'S DISEASE: ICD-10-CM

## 2019-06-24 RX ORDER — LEVOTHYROXINE SODIUM 100 UG/1
TABLET ORAL
Qty: 30 TAB | Refills: 6 | Status: SHIPPED | OUTPATIENT
Start: 2019-06-24 | End: 2019-07-15 | Stop reason: SDUPTHER

## 2019-07-15 ENCOUNTER — OFFICE VISIT (OUTPATIENT)
Dept: ENDOCRINOLOGY | Age: 39
End: 2019-07-15

## 2019-07-15 VITALS
TEMPERATURE: 97.5 F | DIASTOLIC BLOOD PRESSURE: 96 MMHG | BODY MASS INDEX: 31.07 KG/M2 | RESPIRATION RATE: 12 BRPM | OXYGEN SATURATION: 99 % | WEIGHT: 182 LBS | SYSTOLIC BLOOD PRESSURE: 142 MMHG | HEIGHT: 64 IN | HEART RATE: 83 BPM

## 2019-07-15 DIAGNOSIS — E03.8 HYPOTHYROIDISM DUE TO HASHIMOTO'S THYROIDITIS: Primary | ICD-10-CM

## 2019-07-15 DIAGNOSIS — E28.2 PCOS (POLYCYSTIC OVARIAN SYNDROME): ICD-10-CM

## 2019-07-15 DIAGNOSIS — E06.3 HASHIMOTO'S DISEASE: ICD-10-CM

## 2019-07-15 DIAGNOSIS — E06.3 HYPOTHYROIDISM DUE TO HASHIMOTO'S THYROIDITIS: Primary | ICD-10-CM

## 2019-07-15 RX ORDER — LEVOTHYROXINE SODIUM 100 UG/1
TABLET ORAL
Qty: 90 TAB | Refills: 6 | Status: SHIPPED | OUTPATIENT
Start: 2019-07-15 | End: 2019-07-23 | Stop reason: SDUPTHER

## 2019-07-15 RX ORDER — LEVOTHYROXINE SODIUM 100 UG/1
TABLET ORAL
Qty: 30 TAB | Refills: 6 | Status: SHIPPED | OUTPATIENT
Start: 2019-07-15 | End: 2019-07-15 | Stop reason: SDUPTHER

## 2019-07-15 NOTE — PROGRESS NOTES
Charles Mcmahon is a 41 y.o. female here for   Chief Complaint   Patient presents with    Thyroid Problem    PCOS       1. Have you been to the ER, urgent care clinic since your last visit? Hospitalized since your last visit? -no    2. Have you seen or consulted any other health care providers outside of the [facility] since your last visit? Include any pap smears or colon screening. -PCP

## 2019-07-15 NOTE — LETTER
7/15/19 Patient: Melani Fierro YOB: 1980 Date of Visit: 7/15/2019 Rockford Bence, MD 
[address] [place] [address] VIA Facsimile: [phone] Dear Rockford Bence, MD, Thank you for referring Ms. Demetra Hughes to [address] [place] [address] for evaluation. My notes for this consultation are attached. If you have questions, please do not hesitate to call me. I look forward to following your patient along with you. Sincerely, George Santillan MD

## 2019-07-15 NOTE — PROGRESS NOTES
History of present illness    Charles Mcmahon is a 41 y.o. female  here for fu of  Hypothyroidism and PCOS    She is on LT4 consistently , could not get brand medication  She has changed the diet, lost weight  Feels better    She has seen ENT, for odynophagia, PPI helped,   Zantac did not help   Off OCPs because of left Ovarian vein thrombosis   Having cycles on her own  off Metformin due to GI side effects     No history of known radiation exposure    No FH of thyroid disease. No FH of thyroid cancer     Wt Readings from Last 3 Encounters:   07/15/19 182 lb (82.6 kg)   08/27/18 194 lb (88 kg)   02/19/18 190 lb 11.2 oz (86.5 kg)       Past Medical History:   Diagnosis Date    Hashimoto's disease     Hypothyroid     PCOS (polycystic ovarian syndrome)     Seizure ([facility] [address] 12/2016     Wt Readings from Last 3 Encounters:   07/15/19 182 lb (82.6 kg)   08/27/18 194 lb (88 kg)   02/19/18 190 lb 11.2 oz (86.5 kg)     Current Outpatient Medications   Medication Sig    levothyroxine (SYNTHROID) 100 mcg tablet TAKE 1 TAB BY MOUTH DAILY (BEFORE BREAKFAST) MON-FRI. TAKE 2 TABS ON SUNDAYS. STOP 88MCG (Patient taking differently: TAKE 1 TAB BY MOUTH DAILY (BEFORE BREAKFAST) MON-SAT. TAKE 2 TABS ON SUNDAYS. STOP 88MCG)    omeprazole (PRILOSEC) 40 mg capsule Take 40 mg by mouth as needed.  levETIRAcetam (KEPPRA) 500 mg tablet Take  by mouth two (2) times a day.  losartan (COZAAR) 100 mg tablet TAKE 1 TABLET (100 MG) BY ORAL ROUTE ONCE DAILY FOR 30 DAYS    venlafaxine (EFFEXOR) 37.5 mg tablet TAKE 1 TABLET (37.5 MG) QHS    LIDOCAINE VISCOUS 2 % solution     metFORMIN ER (GLUCOPHAGE XR) 500 mg tablet Take 1 Tab by mouth daily.  norethindrone acetate (AYGESTIN) 5 mg tablet Take 5 mg by mouth daily.  fenofibrate (TRICOR) 160 mg tablet Take 1 tablet by mouth daily. No current facility-administered medications for this visit.           Review of Systems:  - Eyes: no blurry vision or double vision  - Cardiovascular: no chest pain or palpitations  - Respiratory: no cough or shortness of breath  - Gastrointestinal: no  abdominal pain  - Musculoskeletal: no joint pains  - Integumentary: no rashes  - Neurological: no numbness, tingling, or headaches  -     Physical Examination:  - Blood pressure (!) 142/96, pulse 83, temperature 97.5 °F (36.4 °C), temperature source Oral, resp. rate 12, height 5' 4\" (1.626 m), weight 182 lb (82.6 kg), SpO2 99 %. Body mass index is 31.24 kg/m². - General: pleasant, no distress, good eye contact  - HEENT: no periorbital edema, no scleral/conjunctival injection, EOMI, no lid lag or stare  - Neck:  could not palpate thyromegaly, severe gag reflex  - Cardiovascular: regular, normal rate, normal S1 and S2  - Respiratory: clear  - Gastrointestinal: soft, nontender, BS +  - Musculoskeletal:  no tremors, no edema  - Neurological: alert and oriented   - Psychiatric: normal mood and affect  - Skin - normal turgor    Data Reviewed:   1/13 TSH 21  Prolactin 14  FSH 5.5    Lab Results   Component Value Date/Time    TSH 1.800 02/20/2019 09:00 AM    T4, Free 1.59 02/20/2019 09:00 AM      Component      Latest Ref Rng 11/5/2013 9/9/2013   Luteinizing hormone        9.7   FSH        5.0   TSH      0.450 - 4.500 uIU/mL 3.660 0.189 (L)   Testosterone, Serum (Total)      10.0 - 55.0 ng/dL  44.0   Prolactin      4.8 - 23.3 ng/mL  9.8   Estradiol        46.0   DHEA Sulfate      98.8 - 340.0 ug/dL  129.2     Lab Results   Component Value Date/Time    Hemoglobin A1c 5.5 02/20/2019 09:00 AM       [x] Reviewed labs    Assessment/Plan:     1. Hypothyroidism due to Hashimoto's thyroiditis    2. Hashimoto's disease    3.  PCOS (polycystic ovarian syndrome)        GERD-seen ENT  PPI helped      Primary hypothyroidism /Hashimoto's thyroditis    US - no nodules 7/14  On LT4 - compliance discussed  Need labs today, TSH    HTN -     PCOS /Prediabetes-  Hx of DVT - no OCPs    Metformin - diarrhea, change to ER  Off Aldactone  off Provera  Commended on the weight loss    Transaminitis could be Ursula Sosa-      Hypertriglyceridemia - on tricor    Seizures - on keppra    Thank you for allowing me to participate in the care of this patient. Ananth Shi MD      Patient verbalized understanding    Voice-recognition software was used to generate this report, which may result in some phonetic-based errors in the grammar and contents. Even though attempts were made to correct all the mistakes, some may have been missed and remained in the body of the report.

## 2019-07-16 LAB
BUN SERPL-MCNC: 10 MG/DL (ref 6–20)
BUN/CREAT SERPL: 12 (ref 9–23)
CALCIUM SERPL-MCNC: 9.1 MG/DL (ref 8.7–10.2)
CHLORIDE SERPL-SCNC: 102 MMOL/L (ref 96–106)
CO2 SERPL-SCNC: 26 MMOL/L (ref 20–29)
CREAT SERPL-MCNC: 0.84 MG/DL (ref 0.57–1)
EST. AVERAGE GLUCOSE BLD GHB EST-MCNC: 103 MG/DL
GLUCOSE SERPL-MCNC: 90 MG/DL (ref 65–99)
HBA1C MFR BLD: 5.2 % (ref 4.8–5.6)
POTASSIUM SERPL-SCNC: 3.7 MMOL/L (ref 3.5–5.2)
SODIUM SERPL-SCNC: 141 MMOL/L (ref 134–144)
TSH SERPL DL<=0.005 MIU/L-ACNC: 5.87 UIU/ML (ref 0.45–4.5)

## 2019-07-23 ENCOUNTER — TELEPHONE (OUTPATIENT)
Dept: ENDOCRINOLOGY | Age: 39
End: 2019-07-23

## 2019-07-23 DIAGNOSIS — E06.3 HASHIMOTO'S DISEASE: ICD-10-CM

## 2019-07-23 RX ORDER — LEVOTHYROXINE SODIUM 100 UG/1
TABLET ORAL
Qty: 102 TAB | Refills: 6 | Status: SHIPPED | OUTPATIENT
Start: 2019-07-23 | End: 2020-06-03 | Stop reason: SDUPTHER

## 2019-07-23 NOTE — TELEPHONE ENCOUNTER
Per Dr. Inderjit Mendez, informed pt of result note, as noted above. Pt verbalized understanding with no further questions or concerns at this time.

## 2019-07-23 NOTE — TELEPHONE ENCOUNTER
----- Message from Radha Nugent MD sent at 7/22/2019  4:53 PM EDT -----  Synthroid 1 tablet MOn- Fri - Sat and Sunday 2 tablets     Need to increase a bit

## 2019-07-23 NOTE — TELEPHONE ENCOUNTER
Attempted to call. Unsuccessful. Left ms for [place] to give us a call back at the office. A callback number was left.

## 2020-01-08 ENCOUNTER — LAB ONLY (OUTPATIENT)
Dept: ENDOCRINOLOGY | Age: 40
End: 2020-01-08

## 2020-01-08 ENCOUNTER — HOSPITAL ENCOUNTER (OUTPATIENT)
Dept: LAB | Age: 40
Discharge: HOME | End: 2020-01-08

## 2020-01-08 DIAGNOSIS — E03.8 HYPOTHYROIDISM DUE TO HASHIMOTO'S THYROIDITIS: ICD-10-CM

## 2020-01-08 DIAGNOSIS — E28.2 PCOS (POLYCYSTIC OVARIAN SYNDROME): ICD-10-CM

## 2020-01-08 DIAGNOSIS — E03.8 HYPOTHYROIDISM DUE TO HASHIMOTO'S THYROIDITIS: Primary | ICD-10-CM

## 2020-01-08 DIAGNOSIS — E06.3 HYPOTHYROIDISM DUE TO HASHIMOTO'S THYROIDITIS: Primary | ICD-10-CM

## 2020-01-08 DIAGNOSIS — E06.3 HASHIMOTO'S DISEASE: ICD-10-CM

## 2020-01-08 DIAGNOSIS — E06.3 HYPOTHYROIDISM DUE TO HASHIMOTO'S THYROIDITIS: ICD-10-CM

## 2020-01-08 LAB
ANION GAP SERPL CALC-SCNC: 5 MMOL/L (ref 5–15)
BUN SERPL-MCNC: 14 MG/DL (ref 6–20)
BUN/CREAT SERPL: 16 (ref 12–20)
CALCIUM SERPL-MCNC: 8.4 MG/DL (ref 8.5–10.1)
CHLORIDE SERPL-SCNC: 109 MMOL/L (ref 97–108)
CO2 SERPL-SCNC: 27 MMOL/L (ref 21–32)
CREAT SERPL-MCNC: 0.89 MG/DL (ref 0.55–1.02)
GLUCOSE SERPL-MCNC: 114 MG/DL (ref 65–100)
POTASSIUM SERPL-SCNC: 4 MMOL/L (ref 3.5–5.1)
SODIUM SERPL-SCNC: 141 MMOL/L (ref 136–145)
TSH SERPL DL<=0.05 MIU/L-ACNC: 3.56 UIU/ML (ref 0.36–3.74)

## 2020-01-09 LAB
EST. AVERAGE GLUCOSE BLD GHB EST-MCNC: 114 MG/DL
HBA1C MFR BLD: 5.6 % (ref 4–5.6)

## 2020-05-12 PROBLEM — F41.9 ANXIETY: Status: ACTIVE | Noted: 2017-01-04

## 2020-05-12 PROBLEM — F32.A DEPRESSIVE DISORDER: Status: ACTIVE | Noted: 2017-01-04

## 2020-05-12 PROBLEM — R56.9 SEIZURE (HCC): Status: ACTIVE | Noted: 2017-01-04

## 2020-05-13 ENCOUNTER — VIRTUAL VISIT (OUTPATIENT)
Dept: ENDOCRINOLOGY | Age: 40
End: 2020-05-13

## 2020-05-13 DIAGNOSIS — R73.9 HYPERGLYCEMIA: ICD-10-CM

## 2020-05-13 DIAGNOSIS — E03.8 HYPOTHYROIDISM DUE TO HASHIMOTO'S THYROIDITIS: Primary | ICD-10-CM

## 2020-05-13 DIAGNOSIS — E03.8 HYPOTHYROIDISM DUE TO HASHIMOTO'S THYROIDITIS: ICD-10-CM

## 2020-05-13 DIAGNOSIS — E06.3 HYPOTHYROIDISM DUE TO HASHIMOTO'S THYROIDITIS: Primary | ICD-10-CM

## 2020-05-13 DIAGNOSIS — L68.0 HIRSUTISM: ICD-10-CM

## 2020-05-13 DIAGNOSIS — E06.3 HYPOTHYROIDISM DUE TO HASHIMOTO'S THYROIDITIS: ICD-10-CM

## 2020-05-13 DIAGNOSIS — E28.2 PCOS (POLYCYSTIC OVARIAN SYNDROME): Primary | ICD-10-CM

## 2020-05-13 DIAGNOSIS — R63.5 WEIGHT GAIN: ICD-10-CM

## 2020-05-13 DIAGNOSIS — E28.2 PCOS (POLYCYSTIC OVARIAN SYNDROME): ICD-10-CM

## 2020-05-13 RX ORDER — METFORMIN HYDROCHLORIDE 500 MG/1
500 TABLET, EXTENDED RELEASE ORAL 2 TIMES DAILY
Qty: 180 TAB | Refills: 4 | Status: SHIPPED | OUTPATIENT
Start: 2020-05-13 | End: 2020-09-21

## 2020-05-13 NOTE — PROGRESS NOTES
Soco Roblero is a 42 y.o. female here for   Chief Complaint   Patient presents with    Thyroid Problem    PCOS    Hirsutism     Pt consented to virtual visit. 1. Have you been to the ER, urgent care clinic since your last visit? Hospitalized since your last visit? -March 2020 [facility] ER for losing virus    2. Have you seen or consulted any other health care providers outside of the [address] [place] [address] since your last visit?   Include any pap smears or colon screening.-no

## 2020-05-13 NOTE — PATIENT INSTRUCTIONS
We will begin a trial of weight loss medication to help with metabolism. Please watch out for chest pain, heart racing, dizziness, or anxiety and let me know if you develop any of these symptoms. Please monitor your blood pressure 1-2 times a week while on this and let me know if you are having any readings over 140/90. You can not be pregnant or get pregnant while you take the medication. If there is any possibility of pregnancy, a pregnancy test should be done to rule out before starting medication.

## 2020-05-13 NOTE — PROGRESS NOTES
Pursuant to the emergency declaration under the [facility] waiver authority and the [employer] Act, this Virtual  Visit was conducted, with patient's consent, to reduce the patient's risk of exposure to COVID-19 . Patient  is aware that this is a billable encounter and is responsible for copays/deductibles       Services were provided through a video synchronous discussion virtually to substitute for in-person clinic visit. Place of service: Provider : Office  Patient: Home  History of present illness    Hetal Bagley is a 42 y.o. female  here for fu of  Hypothyroidism and PCOS    She is on LT4 consistently , could not get brand medication  Difficulty losing weight  She is eating healthy    She has seen ENT, for odynophagia, PPI helped,   Zantac did not help   Off OCPs because of left Ovarian vein thrombosis   Having cycles on her own  off Metformin due to GI side effects     No history of known radiation exposure    No FH of thyroid disease. No FH of thyroid cancer     Wt Readings from Last 3 Encounters:   07/15/19 182 lb (82.6 kg)   08/27/18 194 lb (88 kg)   02/19/18 190 lb 11.2 oz (86.5 kg)       Past Medical History:   Diagnosis Date    Hashimoto's disease     Hypothyroid     PCOS (polycystic ovarian syndrome)     Seizure ([facility] [address] 12/2016     Wt Readings from Last 3 Encounters:   07/15/19 182 lb (82.6 kg)   08/27/18 194 lb (88 kg)   02/19/18 190 lb 11.2 oz (86.5 kg)     Current Outpatient Medications   Medication Sig    levothyroxine (SYNTHROID) 100 mcg tablet TAKE 1 TAB BY MOUTH DAILY (BEFORE BREAKFAST) MON-FRI. TAKE 2 TABS ON SATURDAYS AND SUNDAYS. STOP 88MCG    omeprazole (PRILOSEC) 40 mg capsule Take 40 mg by mouth as needed.  levETIRAcetam (KEPPRA) 500 mg tablet Take  by mouth two (2) times a day.     losartan (COZAAR) 100 mg tablet TAKE 1 TABLET (100 MG) BY ORAL ROUTE ONCE DAILY FOR 30 DAYS    venlafaxine (EFFEXOR) 37.5 mg tablet TAKE 1 TABLET (37.5 MG) QHS    LIDOCAINE VISCOUS 2 % solution     fenofibrate (TRICOR) 160 mg tablet Take 1 tablet by mouth daily. No current facility-administered medications for this visit. Review of Systems:  - Per HPI  -     Physical Examination:  - There were no vitals taken for this visit. There is no height or weight on file to calculate BMI. - General: pleasant, no distress, good eye contact  - HEENT: no exophthalmos, no periorbital edema, EOMI  - Neck: No visible thyromegaly  - RS: Normal respiratory effort  - Musculoskeletal: no tremors  - Neurological: alert and oriented  - Psychiatric: normal mood and affect  - Skin: Normal color    Data Reviewed:   1/13 TSH 21  Prolactin 14  FSH 5.5    Lab Results   Component Value Date/Time    TSH 3.56 01/08/2020 08:55 AM    T4, Free 1.59 02/20/2019 09:00 AM      Component      Latest Ref Rng 11/5/2013 9/9/2013   Luteinizing hormone        9.7   FSH        5.0   TSH      0.450 - 4.500 uIU/mL 3.660 0.189 (L)   Testosterone, Serum (Total)      10.0 - 55.0 ng/dL  44.0   Prolactin      4.8 - 23.3 ng/mL  9.8   Estradiol        46.0   DHEA Sulfate      98.8 - 340.0 ug/dL  129.2     Lab Results   Component Value Date/Time    Hemoglobin A1c 5.6 01/08/2020 08:55 AM       [x] Reviewed labs    Assessment/Plan:     1. Hypothyroidism due to Hashimoto's thyroiditis    2. PCOS (polycystic ovarian syndrome)    3.  Hirsutism        GERD-seen ENT  PPI helped      Primary hypothyroidism /Hashimoto's thyroditis    US - no nodules 7/14  On LT4 - compliance discussed      HTN -controlled    PCOS /Prediabetes-  Hx of DVT - no OCPs    Metformin - diarrhea, change to ER  Off Aldactone  off Provera  Weight gain: Interested in phentermine, need blood pressure monitoring  Not interested in pregnancy    Transaminitis could be Elridge Richelle-      Hypertriglyceridemia - on tricor    Seizures - on keppra    Thank you for allowing me to participate in the care of this patient. Kaylyn Cluadio MD      Patient verbalized understanding    Voice-recognition software was used to generate this report, which may result in some phonetic-based errors in the grammar and contents. Even though attempts were made to correct all the mistakes, some may have been missed and remained in the body of the report.

## 2020-06-02 LAB
BUN SERPL-MCNC: 12 MG/DL (ref 6–20)
BUN/CREAT SERPL: 13 (ref 9–23)
CALCIUM SERPL-MCNC: 9.3 MG/DL (ref 8.7–10.2)
CHLORIDE SERPL-SCNC: 103 MMOL/L (ref 96–106)
CO2 SERPL-SCNC: 21 MMOL/L (ref 20–29)
CREAT SERPL-MCNC: 0.93 MG/DL (ref 0.57–1)
GLUCOSE SERPL-MCNC: 122 MG/DL (ref 65–99)
POTASSIUM SERPL-SCNC: 4.1 MMOL/L (ref 3.5–5.2)
SODIUM SERPL-SCNC: 141 MMOL/L (ref 134–144)
T3 SERPL-MCNC: 129 NG/DL (ref 71–180)
TSH SERPL DL<=0.005 MIU/L-ACNC: 4.82 UIU/ML (ref 0.45–4.5)

## 2020-06-03 ENCOUNTER — TELEPHONE (OUTPATIENT)
Dept: ENDOCRINOLOGY | Age: 40
End: 2020-06-03

## 2020-06-03 DIAGNOSIS — E06.3 HASHIMOTO'S DISEASE: ICD-10-CM

## 2020-06-03 RX ORDER — LEVOTHYROXINE SODIUM 100 UG/1
TABLET ORAL
Qty: 96 TAB | Refills: 3 | Status: SHIPPED | OUTPATIENT
Start: 2020-06-03 | End: 2020-08-03 | Stop reason: SDUPTHER

## 2020-06-03 NOTE — TELEPHONE ENCOUNTER
----- Message from Christy Lindsey MD sent at 6/3/2020  1:06 PM EDT -----  TSH is slightly off    1 tab levothyroxine daily 1.5 tablet on Sunday

## 2020-06-03 NOTE — TELEPHONE ENCOUNTER
Per Dr. Montse Stevens, informed pt of result note, as noted above. Pt verbalized understanding with no further questions or concerns at this time.

## 2020-08-03 DIAGNOSIS — E06.3 HASHIMOTO'S DISEASE: ICD-10-CM

## 2020-08-04 RX ORDER — LEVOTHYROXINE SODIUM 100 UG/1
TABLET ORAL
Qty: 96 TAB | Refills: 3 | Status: SHIPPED | OUTPATIENT
Start: 2020-08-04 | End: 2020-09-21 | Stop reason: ALTCHOICE

## 2020-09-01 DIAGNOSIS — E06.3 HYPOTHYROIDISM DUE TO HASHIMOTO'S THYROIDITIS: ICD-10-CM

## 2020-09-01 DIAGNOSIS — E03.8 HYPOTHYROIDISM DUE TO HASHIMOTO'S THYROIDITIS: ICD-10-CM

## 2020-09-01 DIAGNOSIS — E28.2 PCOS (POLYCYSTIC OVARIAN SYNDROME): ICD-10-CM

## 2020-09-01 DIAGNOSIS — L68.0 HIRSUTISM: ICD-10-CM

## 2020-09-18 LAB
BUN SERPL-MCNC: 15 MG/DL (ref 6–20)
BUN/CREAT SERPL: 17 (ref 9–23)
CALCIUM SERPL-MCNC: 9.2 MG/DL (ref 8.7–10.2)
CHLORIDE SERPL-SCNC: 101 MMOL/L (ref 96–106)
CO2 SERPL-SCNC: 22 MMOL/L (ref 20–29)
CREAT SERPL-MCNC: 0.86 MG/DL (ref 0.57–1)
GLUCOSE SERPL-MCNC: 163 MG/DL (ref 65–99)
POTASSIUM SERPL-SCNC: 3.7 MMOL/L (ref 3.5–5.2)
SODIUM SERPL-SCNC: 141 MMOL/L (ref 134–144)
TSH SERPL DL<=0.005 MIU/L-ACNC: 15.7 UIU/ML (ref 0.45–4.5)

## 2020-09-21 ENCOUNTER — OFFICE VISIT (OUTPATIENT)
Dept: ENDOCRINOLOGY | Age: 40
End: 2020-09-21
Payer: MEDICAID

## 2020-09-21 VITALS
WEIGHT: 200 LBS | HEIGHT: 64 IN | BODY MASS INDEX: 34.15 KG/M2 | HEART RATE: 81 BPM | DIASTOLIC BLOOD PRESSURE: 103 MMHG | OXYGEN SATURATION: 100 % | TEMPERATURE: 97.1 F | RESPIRATION RATE: 14 BRPM | SYSTOLIC BLOOD PRESSURE: 165 MMHG

## 2020-09-21 DIAGNOSIS — E03.8 HYPOTHYROIDISM DUE TO HASHIMOTO'S THYROIDITIS: Primary | ICD-10-CM

## 2020-09-21 DIAGNOSIS — E06.3 HYPOTHYROIDISM DUE TO HASHIMOTO'S THYROIDITIS: Primary | ICD-10-CM

## 2020-09-21 DIAGNOSIS — R73.03 PREDIABETES: ICD-10-CM

## 2020-09-21 DIAGNOSIS — E28.2 PCOS (POLYCYSTIC OVARIAN SYNDROME): ICD-10-CM

## 2020-09-21 DIAGNOSIS — E06.3 HASHIMOTO'S DISEASE: ICD-10-CM

## 2020-09-21 PROCEDURE — 99214 OFFICE O/P EST MOD 30 MIN: CPT | Performed by: INTERNAL MEDICINE

## 2020-09-21 RX ORDER — METFORMIN HYDROCHLORIDE 750 MG/1
750 TABLET, EXTENDED RELEASE ORAL 2 TIMES DAILY
Qty: 180 TAB | Refills: 3 | Status: SHIPPED | OUTPATIENT
Start: 2020-09-21 | End: 2020-11-16 | Stop reason: SDUPTHER

## 2020-09-21 RX ORDER — LEVOTHYROXINE SODIUM 137 UG/1
137 TABLET ORAL
Qty: 90 TAB | Refills: 3 | Status: SHIPPED | OUTPATIENT
Start: 2020-09-21 | End: 2021-02-24

## 2020-09-21 NOTE — PROGRESS NOTES
Senia Couch is a 42 y.o. female here for   Chief Complaint   Patient presents with    Thyroid Problem       1. Have you been to the ER, urgent care clinic since your last visit? Hospitalized since your last visit? -no    2. Have you seen or consulted any other health care providers outside of the [address] [place] [address] since your last visit?   Include any pap smears or colon screening.-no

## 2020-09-21 NOTE — PROGRESS NOTES
History of present illness    Mina Zavala is a 42 y.o. female  here for fu of  Hypothyroidism and PCOS    She is on LT4 consistently , could not get brand medication  Did not take the blood pressure medication today, blood pressure is elevated  Could not tolerate metformin  Is gaining weight    She has seen ENT, for odynophagia, PPI helped,   Zantac did not help   Off OCPs because of left Ovarian vein thrombosis   Having cycles on her own  off Metformin due to GI side effects     No history of known radiation exposure    No FH of thyroid disease. No FH of thyroid cancer     Wt Readings from Last 3 Encounters:   09/21/20 200 lb (90.7 kg)   07/15/19 182 lb (82.6 kg)   08/27/18 194 lb (88 kg)       Past Medical History:   Diagnosis Date    Hashimoto's disease     Hypothyroid     PCOS (polycystic ovarian syndrome)     Seizure ([facility] [address] 12/2016     Wt Readings from Last 3 Encounters:   09/21/20 200 lb (90.7 kg)   07/15/19 182 lb (82.6 kg)   08/27/18 194 lb (88 kg)     Current Outpatient Medications   Medication Sig    omeprazole (PRILOSEC) 40 mg capsule Take 40 mg by mouth as needed.  losartan (COZAAR) 100 mg tablet TAKE 1 TABLET (100 MG) BY ORAL ROUTE ONCE DAILY FOR 30 DAYS    venlafaxine (EFFEXOR) 37.5 mg tablet TAKE 1 TABLET (37.5 MG) QHS    levothyroxine (SYNTHROID) 137 mcg tablet Take 137 mcg by mouth Daily (before breakfast). Stop 100 mcg    metFORMIN ER (GLUCOPHAGE XR) 750 mg tablet Take 1 Tab by mouth two (2) times a day.  LIDOCAINE VISCOUS 2 % solution     levETIRAcetam (KEPPRA) 500 mg tablet Take  by mouth two (2) times a day.  fenofibrate (TRICOR) 160 mg tablet Take 1 tablet by mouth daily. No current facility-administered medications for this visit. Review of Systems:  - Review of systems negative other than mentioned in HPI  -     Physical Examination:  - Blood pressure (!) 165/103, pulse 81, temperature 97.1 °F (36.2 °C), temperature source Oral, resp.  rate 14, height 5' 4\" (1.626 m), weight 200 lb (90.7 kg), SpO2 100 %. Body mass index is 34.33 kg/m². - General: pleasant, no distress, good eye contact  - HEENT: no exophthalmos, no periorbital edema, EOMI  - Neck: No thyromegaly  - RS: Normal respiratory effort  - Musculoskeletal: no tremors  - Neurological: alert and oriented  - Psychiatric: normal mood and affect  - Skin: Normal color    Data Reviewed:   1/13 TSH 21  Prolactin 14  FSH 5.5    Lab Results   Component Value Date/Time    TSH 15.700 (H) 09/17/2020 03:14 PM    T4, Free 1.59 02/20/2019 09:00 AM      Component      Latest Ref Rng 11/5/2013 9/9/2013   Luteinizing hormone        9.7   FSH        5.0   TSH      0.450 - 4.500 uIU/mL 3.660 0.189 (L)   Testosterone, Serum (Total)      10.0 - 55.0 ng/dL  44.0   Prolactin      4.8 - 23.3 ng/mL  9.8   Estradiol        46.0   DHEA Sulfate      98.8 - 340.0 ug/dL  129.2     Lab Results   Component Value Date/Time    Hemoglobin A1c 5.6 01/08/2020 08:55 AM       [x] Reviewed labs    Assessment/Plan:     1. Hypothyroidism due to Hashimoto's thyroiditis    2. Hashimoto's disease    3. PCOS (polycystic ovarian syndrome)    4. Prediabetes        GERD-seen ENT  PPI helped      Primary hypothyroidism /Hashimoto's thyroditis  Adjusted thyroxine    US - no nodules 7/14  On LT4 - compliance discussed      HTN -controlled    PCOS /Prediabetes-  Hx of DVT - no OCPs    Metformin - diarrhea, changed to ER reports headache   Off Aldactone  off Provera  Weight gain: No phentermine due to uncontrolled hypertension  Discussed about weight loss,    Transaminitis could be Latasha Conner-      Hypertriglyceridemia - on tricor    Seizures - on keppra    Thank you for allowing me to participate in the care of this patient. Morro Sylvester MD      Patient verbalized understanding    Voice-recognition software was used to generate this report, which may result in some phonetic-based errors in the grammar and contents.   Even though attempts were made to correct all the mistakes, some may have been missed and remained in the body of the report.

## 2020-09-21 NOTE — LETTER
9/22/20 Patient: [facility] YOB: 1980 Date of Visit: 9/21/2020 Phil Felder NP 
[facility] [phone] [place] [address] VIA In Basket Dear Phil Felder NP, Thank you for referring Ms. Obdulia Chris to [address] [address] for evaluation. My notes for this consultation are attached. If you have questions, please do not hesitate to call me. I look forward to following your patient along with you. Sincerely, Mel Weber MD

## 2020-11-09 LAB
BUN SERPL-MCNC: 14 MG/DL (ref 6–20)
BUN/CREAT SERPL: 17 (ref 9–23)
CALCIUM SERPL-MCNC: 9.4 MG/DL (ref 8.7–10.2)
CHLORIDE SERPL-SCNC: 104 MMOL/L (ref 96–106)
CO2 SERPL-SCNC: 21 MMOL/L (ref 20–29)
CREAT SERPL-MCNC: 0.84 MG/DL (ref 0.57–1)
EST. AVERAGE GLUCOSE BLD GHB EST-MCNC: 123 MG/DL
GLUCOSE SERPL-MCNC: 114 MG/DL (ref 65–99)
HBA1C MFR BLD: 5.9 % (ref 4.8–5.6)
POTASSIUM SERPL-SCNC: 4.2 MMOL/L (ref 3.5–5.2)
SODIUM SERPL-SCNC: 140 MMOL/L (ref 134–144)
T4 FREE SERPL-MCNC: 1.67 NG/DL (ref 0.82–1.77)
TSH SERPL DL<=0.005 MIU/L-ACNC: 1.96 UIU/ML (ref 0.45–4.5)

## 2020-11-16 ENCOUNTER — OFFICE VISIT (OUTPATIENT)
Dept: ENDOCRINOLOGY | Age: 40
End: 2020-11-16
Payer: MEDICAID

## 2020-11-16 VITALS
DIASTOLIC BLOOD PRESSURE: 94 MMHG | OXYGEN SATURATION: 100 % | WEIGHT: 193 LBS | RESPIRATION RATE: 16 BRPM | SYSTOLIC BLOOD PRESSURE: 141 MMHG | BODY MASS INDEX: 32.95 KG/M2 | HEART RATE: 74 BPM | HEIGHT: 64 IN | TEMPERATURE: 97 F

## 2020-11-16 DIAGNOSIS — E28.2 PCOS (POLYCYSTIC OVARIAN SYNDROME): ICD-10-CM

## 2020-11-16 DIAGNOSIS — E06.3 HYPOTHYROIDISM DUE TO HASHIMOTO'S THYROIDITIS: Primary | ICD-10-CM

## 2020-11-16 DIAGNOSIS — E03.8 HYPOTHYROIDISM DUE TO HASHIMOTO'S THYROIDITIS: Primary | ICD-10-CM

## 2020-11-16 DIAGNOSIS — E06.3 HASHIMOTO'S DISEASE: ICD-10-CM

## 2020-11-16 DIAGNOSIS — L68.0 HIRSUTISM: ICD-10-CM

## 2020-11-16 PROCEDURE — 99214 OFFICE O/P EST MOD 30 MIN: CPT | Performed by: INTERNAL MEDICINE

## 2020-11-16 RX ORDER — METFORMIN HYDROCHLORIDE 750 MG/1
750 TABLET, EXTENDED RELEASE ORAL 2 TIMES DAILY
Qty: 180 TAB | Refills: 3 | Status: SHIPPED | OUTPATIENT
Start: 2020-11-16 | End: 2021-02-24

## 2020-11-16 NOTE — PROGRESS NOTES
Chief complaint: PCOS    History of present illness    Charissa Parson is a 47 y.o. female  here for fu of  Hypothyroidism and PCOS    She is on LT4 consistently , could not get brand medication  Could not tolerate immediate release Metformin,  Lost some weight  No chest pain    She has seen ENT, for odynophagia, PPI helped,   Zantac did not help   Off OCPs because of left Ovarian vein thrombosis   Having cycles on her own      No history of known radiation exposure    No FH of thyroid disease. No FH of thyroid cancer     Wt Readings from Last 3 Encounters:   11/16/20 193 lb (87.5 kg)   09/21/20 200 lb (90.7 kg)   07/15/19 182 lb (82.6 kg)       Past Medical History:   Diagnosis Date    Hashimoto's disease     Hypothyroid     PCOS (polycystic ovarian syndrome)     Seizure ([facility] [address] 12/2016     Wt Readings from Last 3 Encounters:   11/16/20 193 lb (87.5 kg)   09/21/20 200 lb (90.7 kg)   07/15/19 182 lb (82.6 kg)     Current Outpatient Medications   Medication Sig    levothyroxine (SYNTHROID) 137 mcg tablet Take 137 mcg by mouth Daily (before breakfast). Stop 100 mcg    omeprazole (PRILOSEC) 40 mg capsule Take 40 mg by mouth as needed.  losartan (COZAAR) 100 mg tablet TAKE 1 TABLET (100 MG) BY ORAL ROUTE ONCE DAILY FOR 30 DAYS    venlafaxine (EFFEXOR) 37.5 mg tablet Take 37.5 mg by mouth nightly as needed.  metFORMIN ER (GLUCOPHAGE XR) 750 mg tablet Take 1 Tab by mouth two (2) times a day.  LIDOCAINE VISCOUS 2 % solution     levETIRAcetam (KEPPRA) 500 mg tablet Take  by mouth two (2) times a day.  fenofibrate (TRICOR) 160 mg tablet Take 1 tablet by mouth daily. No current facility-administered medications for this visit. Review of Systems:  - Review of systems negative other than mentioned in HPI  -     Physical Examination:  - Blood pressure (!) 141/94, pulse 74, temperature 97 °F (36.1 °C), temperature source Oral, resp.  rate 16, height 5' 4\" (1.626 m), weight 193 lb (87.5 kg), SpO2 100 %.    Body mass index is 33.13 kg/m². - General: pleasant, no distress, good eye contact  - HEENT: no exophthalmos, no periorbital edema, EOMI  - Neck: No thyromegaly  - CVS: S1-S2 regular  - RS: Normal respiratory effort  - Musculoskeletal: no tremors  - Neurological: alert and oriented  - Psychiatric: normal mood and affect  - Skin: Normal color    Data Reviewed:   1/13 TSH 21  Prolactin 14  FSH 5.5    Lab Results   Component Value Date/Time    TSH 1.960 11/07/2020 10:43 AM    T4, Free 1.67 11/07/2020 10:43 AM      Component      Latest Ref Rng 11/5/2013 9/9/2013   Luteinizing hormone        9.7   FSH        5.0   TSH      0.450 - 4.500 uIU/mL 3.660 0.189 (L)   Testosterone, Serum (Total)      10.0 - 55.0 ng/dL  44.0   Prolactin      4.8 - 23.3 ng/mL  9.8   Estradiol        46.0   DHEA Sulfate      98.8 - 340.0 ug/dL  129.2     Lab Results   Component Value Date/Time    Hemoglobin A1c 5.9 (H) 11/07/2020 10:43 AM       [x] Reviewed labs    Assessment/Plan:     1. Hypothyroidism due to Hashimoto's thyroiditis    2. PCOS (polycystic ovarian syndrome)    3. Hirsutism        GERD-seen ENT  PPI helped      Primary hypothyroidism /Hashimoto's thyroditis  Euthyroid    US - no nodules 7/14  On LT4 - compliance discussed      HTN -controlled    PCOS /Prediabetes-  Hx of DVT - no OCPs    Metformin - diarrhea, changed to ER reports headache   Off Aldactone  off Provera  Weight gain: No phentermine due to uncontrolled hypertension  Discussed about weight loss,    Transaminitis could be Birda Fuel-      Hypertriglyceridemia - on tricor    Seizures - on keppra    Thank you for allowing me to participate in the care of this patient. Dileep Bashir MD      Patient verbalized understanding    Voice-recognition software was used to generate this report, which may result in some phonetic-based errors in the grammar and contents.   Even though attempts were made to correct all the mistakes, some may have been missed and remained in the body of the report.

## 2020-11-16 NOTE — LETTER
11/17/20 Patient: Radha Ochoa YOB: 1980 Date of Visit: 11/16/2020 Kel Hanson NP 
[place] [phone] [place] [address] VIA In Basket Dear Kel Hanson NP, Thank you for referring Ms. Cole Ewing to [address] [address] for evaluation. My notes for this consultation are attached. If you have questions, please do not hesitate to call me. I look forward to following your patient along with you. Sincerely, Rich Zheng MD

## 2020-11-16 NOTE — PROGRESS NOTES
Ary Son is a 47 y.o. female here for   Chief Complaint   Patient presents with    Thyroid Problem    PCOS       1. Have you been to the ER, urgent care clinic since your last visit? Hospitalized since your last visit? -no    2. Have you seen or consulted any other health care providers outside of the [address] [place] [address] since your last visit?   Include any pap smears or colon screening.-no

## 2021-02-16 DIAGNOSIS — E06.3 HASHIMOTO'S DISEASE: ICD-10-CM

## 2021-02-16 DIAGNOSIS — R73.03 PREDIABETES: ICD-10-CM

## 2021-02-16 RX ORDER — LOSARTAN POTASSIUM 100 MG/1
TABLET ORAL
Qty: 30 TAB | Refills: 11 | OUTPATIENT
Start: 2021-02-16

## 2021-02-24 ENCOUNTER — VIRTUAL VISIT (OUTPATIENT)
Dept: PRIMARY CARE CLINIC | Age: 41
End: 2021-02-24
Payer: MEDICAID

## 2021-02-24 DIAGNOSIS — I10 ESSENTIAL HYPERTENSION: Primary | ICD-10-CM

## 2021-02-24 DIAGNOSIS — R73.03 PREDIABETES: ICD-10-CM

## 2021-02-24 DIAGNOSIS — Z78.9 WEIGHT LOSS ADVISED: ICD-10-CM

## 2021-02-24 DIAGNOSIS — I10 ESSENTIAL HYPERTENSION: ICD-10-CM

## 2021-02-24 PROCEDURE — 99214 OFFICE O/P EST MOD 30 MIN: CPT | Performed by: NURSE PRACTITIONER

## 2021-02-24 RX ORDER — LOSARTAN POTASSIUM 100 MG/1
TABLET ORAL
COMMUNITY
End: 2021-02-24 | Stop reason: SDUPTHER

## 2021-02-24 RX ORDER — LOSARTAN POTASSIUM 100 MG/1
TABLET ORAL
Qty: 90 TAB | Refills: 1 | Status: SHIPPED | OUTPATIENT
Start: 2021-02-24 | End: 2021-05-17

## 2021-02-24 RX ORDER — LEVOTHYROXINE SODIUM 137 UG/1
137 TABLET ORAL
COMMUNITY
End: 2021-05-17 | Stop reason: DRUGHIGH

## 2021-02-24 RX ORDER — AMLODIPINE BESYLATE 5 MG/1
5 TABLET ORAL DAILY
Qty: 90 TAB | Refills: 1 | Status: SHIPPED | OUTPATIENT
Start: 2021-02-24 | End: 2021-06-24 | Stop reason: SDUPTHER

## 2021-02-24 RX ORDER — OMEPRAZOLE 40 MG/1
CAPSULE, DELAYED RELEASE ORAL
COMMUNITY
End: 2021-05-17

## 2021-02-24 RX ORDER — LOSARTAN POTASSIUM 100 MG/1
TABLET ORAL
Qty: 30 TAB | Refills: 11 | OUTPATIENT
Start: 2021-02-24

## 2021-02-24 NOTE — PATIENT INSTRUCTIONS
High Blood Pressure: Care Instructions Overview It's normal for blood pressure to go up and down throughout the day. But if it stays up, you have high blood pressure. Another name for high blood pressure is hypertension. Despite what a lot of people think, high blood pressure usually doesn't cause headaches or make you feel dizzy or lightheaded. It usually has no symptoms. But it does increase your risk of stroke, heart attack, and other problems. You and your doctor will talk about your risks of these problems based on your blood pressure. Your doctor will give you a goal for your blood pressure. Your goal will be based on your health and your age. Lifestyle changes, such as eating healthy and being active, are always important to help lower blood pressure. You might also take medicine to reach your blood pressure goal. 
Follow-up care is a key part of your treatment and safety. Be sure to make and go to all appointments, and call your doctor if you are having problems. It's also a good idea to know your test results and keep a list of the medicines you take. How can you care for yourself at home? Medical treatment · If you stop taking your medicine, your blood pressure will go back up. You may take one or more types of medicine to lower your blood pressure. Be safe with medicines. Take your medicine exactly as prescribed. Call your doctor if you think you are having a problem with your medicine. · Talk to your doctor before you start taking aspirin every day. Aspirin can help certain people lower their risk of a heart attack or stroke. But taking aspirin isn't right for everyone, because it can cause serious bleeding. · See your doctor regularly. You may need to see the doctor more often at first or until your blood pressure comes down. · If you are taking blood pressure medicine, talk to your doctor before you take decongestants or anti-inflammatory medicine, such as ibuprofen. Some of these medicines can raise blood pressure. · Learn how to check your blood pressure at home. Lifestyle changes · Stay at a healthy weight. This is especially important if you put on weight around the waist. Losing even 10 pounds can help you lower your blood pressure. · If your doctor recommends it, get more exercise. Walking is a good choice. Bit by bit, increase the amount you walk every day. Try for at least 30 minutes on most days of the week. You also may want to swim, bike, or do other activities. · Avoid or limit alcohol. Talk to your doctor about whether you can drink any alcohol. · Try to limit how much sodium you eat to less than 2,300 milligrams (mg) a day. Your doctor may ask you to try to eat less than 1,500 mg a day. · Eat plenty of fruits (such as bananas and oranges), vegetables, legumes, whole grains, and low-fat dairy products. · Lower the amount of saturated fat in your diet. Saturated fat is found in animal products such as milk, cheese, and meat. Limiting these foods may help you lose weight and also lower your risk for heart disease. · Do not smoke. Smoking increases your risk for heart attack and stroke. If you need help quitting, talk to your doctor about stop-smoking programs and medicines. These can increase your chances of quitting for good. When should you call for help? Call  911 anytime you think you may need emergency care. This may mean having symptoms that suggest that your blood pressure is causing a serious heart or blood vessel problem. Your blood pressure may be over 180/120. For example, call 911 if: 
  · You have symptoms of a heart attack. These may include: 
? Chest pain or pressure, or a strange feeling in the chest. 
? Sweating. ? Shortness of breath. ? Nausea or vomiting. ? Pain, pressure, or a strange feeling in the back, neck, jaw, or upper belly or in one or both shoulders or arms. ? Lightheadedness or sudden weakness. ? A fast or irregular heartbeat.  
  · You have symptoms of a stroke. These may include: 
? Sudden numbness, tingling, weakness, or loss of movement in your face, arm, or leg, especially on only one side of your body. ? Sudden vision changes. ? Sudden trouble speaking. ? Sudden confusion or trouble understanding simple statements. ? Sudden problems with walking or balance. ? A sudden, severe headache that is different from past headaches.  
  · You have severe back or belly pain. Do not wait until your blood pressure comes down on its own. Get help right away. Call your doctor now or seek immediate care if: 
  · Your blood pressure is much higher than normal (such as 180/120 or higher), but you don't have symptoms.  
  · You think high blood pressure is causing symptoms, such as: 
? Severe headache. 
? Blurry vision. Watch closely for changes in your health, and be sure to contact your doctor if: 
  · Your blood pressure measures higher than your doctor recommends at least 2 times. That means the top number is higher or the bottom number is higher, or both.  
  · You think you may be having side effects from your blood pressure medicine. Where can you learn more? Go to http://www.[employer].com/ Enter I231 in the search box to learn more about \"High Blood Pressure: Care Instructions. \" Current as of: December 16, 2019               Content Version: 12.6 © 3025-4835 [employer]. Care instructions adapted under license by [employer] (which disclaims liability or warranty for this information). If you have questions about a medical condition or this instruction, always ask your healthcare professional. [address] any warranty or liability for your use of this information.

## 2021-02-24 NOTE — PROGRESS NOTES
HISTORY OF PRESENT ILLNESS  Nanda Fuentes is a 47 y.o. female presents via telemedicine for hypertension refill;    States her BP fluxuates but stays near 140/80's on a regular basis. ON the meds. Does see a endocrine (Dr Sy Monroy) for thyroid and pre-diabetes. Struggles with weight / weight loss was having increased headaches while on metformin and stopped. There were no vitals filed for this visit. Patient Active Problem List   Diagnosis Code    Hypothyroid E03.9    Hashimoto's disease E06.3    Secondary amenorrhea N91.1    Hirsutism L68.0    PCOS (polycystic ovarian syndrome) E28.2    Prediabetes R73.03    Hyperglycemia R73.9    Anxiety F41.9    Depressive disorder F32.9    Seizure ([facility] [address] R56.9     Patient Active Problem List    Diagnosis Date Noted    Hyperglycemia 2017    Anxiety 2017    Depressive disorder 2017    Seizure ([facility] [address] 2017    Prediabetes 2014    PCOS (polycystic ovarian syndrome) 2014    Secondary amenorrhea 2013    Hirsutism 2013    Hashimoto's disease     Hypothyroid 2013     Current Outpatient Medications   Medication Sig Dispense Refill    losartan (COZAAR) 100 mg tablet losartan 100 mg tablet      omeprazole (PRILOSEC) 40 mg capsule omeprazole 40 mg capsule,delayed release      levothyroxine (SYNTHROID) 137 mcg tablet Take 137 mcg by mouth Daily (before breakfast).        No Known Allergies  Past Medical History:   Diagnosis Date    Hashimoto's disease     Hypothyroid     PCOS (polycystic ovarian syndrome)     Seizure ([facility] [address] 2016     Past Surgical History:   Procedure Laterality Date    HX  SECTION      x2    HX CHOLECYSTECTOMY  10/2018     Family History   Problem Relation Age of Onset    Diabetes Mother     Diabetes Sister     Diabetes Sister      Social History     Tobacco Use    Smoking status: Never Smoker    Smokeless tobacco: Never Used   Substance Use Topics    Alcohol use: No           Review of Systems   Constitutional: Negative for fever. Respiratory: Negative for cough and shortness of breath. Cardiovascular: Negative for chest pain and palpitations. Gastrointestinal: Negative for constipation and diarrhea. Neurological: Positive for headaches. Negative for dizziness. Psychiatric/Behavioral: Negative for depression. The patient is not nervous/anxious and does not have insomnia. Physical Exam  Constitutional:       Appearance: Normal appearance. She is obese. Comments: As seen on video chat   HENT:      Head: Normocephalic and atraumatic. Comments: As seen on video chat     Nose: Nose normal.      Comments: As seen on video chat  Eyes:      Extraocular Movements: Extraocular movements intact. Comments: As seen on video chat   Neck:      Musculoskeletal: Normal range of motion. Comments: As seen on video chat  Pulmonary:      Effort: Pulmonary effort is normal.   Neurological:      General: No focal deficit present. Mental Status: She is alert and oriented to person, place, and time. Comments: As seen on video chat   Psychiatric:         Mood and Affect: Mood normal.         Behavior: Behavior normal.         Thought Content: Thought content normal.         Judgment: Judgment normal.           ASSESSMENT and PLAN  Diagnoses and all orders for this visit:    1. Essential hypertension  -     losartan (COZAAR) 100 mg tablet; losartan 100 mg tablet  -     amLODIPine (NORVASC) 5 mg tablet; Take 1 Tab by mouth daily. Will add on amlodipine for better coverage. 2. Weight Loss Advised. Sent message to endocrine about maybe starting on GLP1 for prediabetes and or weight loss ? [facility]ilcity    3. Pre-diabetes managed by dr Gm Lucero. Couldn't tolerate metformin due to headaches.  Might try GLP1    Luis Eduardo Carey who was evaluated through a synchronous (real-time) audio-video encounter, and/or his healthcare decision maker, is aware that it is a billable service, with coverage as determined by his insurance carrier. He provided verbal consent to proceed: Yes, and patient identification was verified. It was conducted pursuant to the emergency declaration under the [address] [place] [address] and the [employer] General Act. A caregiver was present when appropriate. Ability to conduct physical exam was limited. I was at home. The patient was at home.           Garett Gunter NP

## 2021-05-10 LAB
EST. AVERAGE GLUCOSE BLD GHB EST-MCNC: 131 MG/DL
HBA1C MFR BLD: 6.2 % (ref 4.8–5.6)
TSH SERPL DL<=0.005 MIU/L-ACNC: 10.2 UIU/ML (ref 0.45–4.5)

## 2021-05-14 DIAGNOSIS — I10 ESSENTIAL HYPERTENSION: ICD-10-CM

## 2021-05-17 ENCOUNTER — OFFICE VISIT (OUTPATIENT)
Dept: ENDOCRINOLOGY | Age: 41
End: 2021-05-17
Payer: MEDICAID

## 2021-05-17 VITALS
TEMPERATURE: 97.4 F | DIASTOLIC BLOOD PRESSURE: 85 MMHG | BODY MASS INDEX: 33.63 KG/M2 | HEART RATE: 96 BPM | WEIGHT: 197 LBS | HEIGHT: 64 IN | OXYGEN SATURATION: 99 % | SYSTOLIC BLOOD PRESSURE: 144 MMHG

## 2021-05-17 DIAGNOSIS — E28.2 PCOS (POLYCYSTIC OVARIAN SYNDROME): Primary | ICD-10-CM

## 2021-05-17 DIAGNOSIS — E06.3 HYPOTHYROIDISM DUE TO HASHIMOTO'S THYROIDITIS: ICD-10-CM

## 2021-05-17 DIAGNOSIS — E03.8 HYPOTHYROIDISM DUE TO HASHIMOTO'S THYROIDITIS: ICD-10-CM

## 2021-05-17 DIAGNOSIS — E06.3 HASHIMOTO'S DISEASE: ICD-10-CM

## 2021-05-17 DIAGNOSIS — L68.0 HIRSUTISM: ICD-10-CM

## 2021-05-17 PROCEDURE — 99214 OFFICE O/P EST MOD 30 MIN: CPT | Performed by: INTERNAL MEDICINE

## 2021-05-17 RX ORDER — LOSARTAN POTASSIUM 100 MG/1
TABLET ORAL
Qty: 30 TAB | Refills: 0 | Status: SHIPPED | OUTPATIENT
Start: 2021-05-17 | End: 2021-06-14

## 2021-05-17 RX ORDER — LEVOTHYROXINE SODIUM 150 MCG
150 TABLET ORAL
Qty: 90 TAB | Refills: 3 | Status: SHIPPED | OUTPATIENT
Start: 2021-05-17 | End: 2021-08-15 | Stop reason: DRUGHIGH

## 2021-05-17 NOTE — PROGRESS NOTES
Chief complaint: PCOS    History of present illness    Jim Metz is a 47 y.o. female  here for fu of  Hypothyroidism and PCOS    She is on LT4 consistently , could not get brand medication  Could not tolerate immediate release Metformin,  TSH is fluctuating, reports compliance  Has underlying GERD  No weight loss    She has seen ENT, for odynophagia, PPI helped,   Zantac did not help   Off OCPs because of left Ovarian vein thrombosis   Having cycles on her own      No history of known radiation exposure    No FH of thyroid disease. No FH of thyroid cancer     Wt Readings from Last 3 Encounters:   11/16/20 193 lb (87.5 kg)   09/21/20 200 lb (90.7 kg)   07/15/19 182 lb (82.6 kg)       Past Medical History:   Diagnosis Date    Hashimoto's disease     Hypothyroid     PCOS (polycystic ovarian syndrome)     Seizure ([facility] [address] 12/2016     Wt Readings from Last 3 Encounters:   11/16/20 193 lb (87.5 kg)   09/21/20 200 lb (90.7 kg)   07/15/19 182 lb (82.6 kg)     Current Outpatient Medications   Medication Sig    omeprazole (PRILOSEC) 40 mg capsule omeprazole 40 mg capsule,delayed release    levothyroxine (SYNTHROID) 137 mcg tablet Take 137 mcg by mouth Daily (before breakfast).  losartan (COZAAR) 100 mg tablet losartan 100 mg tablet    amLODIPine (NORVASC) 5 mg tablet Take 1 Tab by mouth daily. No current facility-administered medications for this visit. Review of Systems:  - Review of systems negative other than mentioned in HPI  -     Physical Examination:  - There were no vitals taken for this visit. There is no height or weight on file to calculate BMI.   - General: pleasant, no distress, good eye contact  - HEENT: no exophthalmos, no periorbital edema, EOMI  - Neck: No thyromegaly  - CVS: S1-S2 regular  - RS: Normal respiratory effort  - Musculoskeletal: no tremors  - Neurological: alert and oriented  - Psychiatric: normal mood and affect  - Skin: Normal color    Data Reviewed:   1/13 TSH 21 Prolactin 14  FSH 5.5    Lab Results   Component Value Date/Time    TSH 10.200 (H) 05/08/2021 11:29 AM    T4, Free 1.67 11/07/2020 10:43 AM      Component      Latest Ref Rng 11/5/2013 9/9/2013   Luteinizing hormone        9.7   FSH        5.0   TSH      0.450 - 4.500 uIU/mL 3.660 0.189 (L)   Testosterone, Serum (Total)      10.0 - 55.0 ng/dL  44.0   Prolactin      4.8 - 23.3 ng/mL  9.8   Estradiol        46.0   DHEA Sulfate      98.8 - 340.0 ug/dL  129.2     Lab Results   Component Value Date/Time    Hemoglobin A1c 6.2 (H) 05/08/2021 11:29 AM       [x] Reviewed labs    Assessment/Plan:     1. PCOS (polycystic ovarian syndrome)    2. Hashimoto's disease    3. Hypothyroidism due to Hashimoto's thyroiditis    4. Hirsutism        GERD-seen ENT  PPI helped      Primary hypothyroidism /Hashimoto's thyroditis  Synthroid    US - no nodules 7/14  On LT4 - compliance discussed      HTN -not at goal    PCOS /Prediabetes-  Hx of DVT - no OCPs    Metformin - diarrhea, changed to ER reports headache   Off Aldactone  off Provera   No phentermine due to uncontrolled hypertension  Discussed about weight loss, intermittent fasting, it is difficult to get weekly GLP-1 agonist with Medicaid      Hypertriglyceridemia - on tricor    Seizures - on keppra    Thank you for allowing me to participate in the care of this patient. Tessa Caldwell MD      Patient verbalized understanding    Voice-recognition software was used to generate this report, which may result in some phonetic-based errors in the grammar and contents. Even though attempts were made to correct all the mistakes, some may have been missed and remained in the body of the report.

## 2021-06-02 ENCOUNTER — TELEPHONE (OUTPATIENT)
Dept: ENDOCRINOLOGY | Age: 41
End: 2021-06-02

## 2021-06-02 NOTE — TELEPHONE ENCOUNTER
Attempted to call. Unsuccessful. Left [facility] for Carolann Holguin to give us a call back at the office. A callback number was left. PA was denied for Synthroid. We submitted an appeal for the denial but insurance will not process the appeal until the member gives written consent.

## 2021-06-03 NOTE — TELEPHONE ENCOUNTER
----- Message from Bernadette Rose sent at 6/2/2021  3:59 PM EDT -----  Regarding: Dr. Jackson Mao: [phone]  Patient return call    Caller's first and last name and relationship (if not the patient):Pt      Best contact number(s):[phone]        Whose call is being returned:Paige      Details to clarify the request:Pt is returning a call and would like a call back.        Bernadette Rose

## 2021-06-14 DIAGNOSIS — I10 ESSENTIAL HYPERTENSION: ICD-10-CM

## 2021-06-14 RX ORDER — LOSARTAN POTASSIUM 100 MG/1
TABLET ORAL
Qty: 30 TABLET | Refills: 0 | Status: SHIPPED | OUTPATIENT
Start: 2021-06-14 | End: 2021-06-24

## 2021-06-15 ENCOUNTER — TELEPHONE (OUTPATIENT)
Dept: PRIMARY CARE CLINIC | Age: 41
End: 2021-06-15

## 2021-06-15 NOTE — TELEPHONE ENCOUNTER
Patient returning call in regards to scheduling an appointment. Patient is confused due to her being told that she needs to schedule another appointment. Patient stated that she was under the impression that she would only need to come in for appointments every 6 months. Patient currently has an appointment set up in September. Patient would like a call back at [phone] whenever possible.

## 2021-06-24 ENCOUNTER — VIRTUAL VISIT (OUTPATIENT)
Dept: PRIMARY CARE CLINIC | Age: 41
End: 2021-06-24
Payer: MEDICAID

## 2021-06-24 DIAGNOSIS — I10 ESSENTIAL HYPERTENSION: Chronic | ICD-10-CM

## 2021-06-24 DIAGNOSIS — F41.8 MIXED ANXIETY AND DEPRESSIVE DISORDER: Primary | Chronic | ICD-10-CM

## 2021-06-24 PROCEDURE — 99214 OFFICE O/P EST MOD 30 MIN: CPT | Performed by: NURSE PRACTITIONER

## 2021-06-24 RX ORDER — LEVOTHYROXINE SODIUM 137 UG/1
TABLET ORAL
COMMUNITY
End: 2021-08-15

## 2021-06-24 RX ORDER — LOSARTAN POTASSIUM 100 MG/1
TABLET ORAL
Qty: 90 TABLET | Refills: 1 | Status: SHIPPED | OUTPATIENT
Start: 2021-06-24 | End: 2021-07-13

## 2021-06-24 RX ORDER — AMLODIPINE BESYLATE 5 MG/1
5 TABLET ORAL DAILY
Qty: 90 TABLET | Refills: 1 | Status: SHIPPED | OUTPATIENT
Start: 2021-06-24 | End: 2021-08-31 | Stop reason: SDUPTHER

## 2021-06-24 NOTE — PROGRESS NOTES
HISTORY OF PRESENT ILLNESS  Morenita Carlin is a 47 y.o. female presents via telemedicine for follow up on HTN      Here for a follow up on Hypertension      [x]Bloodpressure well controlled at home and monitors with home device  [x]Denies Headache, Chest pain, Visual changes, Dizziness, and difficulty breathing  [x]Is taking current home meds as prescribed    Takes: [ARBs] []Valsartan; [x]Losartan; [] Olmesartan;[] Irbesartan  [ACE inhibitors] []Lisinopril; []Enalapril; []Ramipril; []Benazapril  [CCB] [x]Amlodipine []Cardizem/Diltiazem  [Diuretics] []HCTZ; []Chlorthalidone; []Lasix; []Indapamide;  [B blockers][]Atenolol; []Metoprolol; []Propanolol; []Bystolic (nebivolol); []Carvediolol; [other] []Clonidine tabs scheduled []Clonidine Tabs PRN []Clonidine patches  [] other [as listed on meds]    Follow-up on anxiety depression not currently use any medications now and does not feel that she needs any currently everything is going \"okay\"      There were no vitals filed for this visit.   Patient Active Problem List   Diagnosis Code    Hypothyroid E03.9    Hashimoto's disease E06.3    Secondary amenorrhea N91.1    Hirsutism L68.0    PCOS (polycystic ovarian syndrome) E28.2    Prediabetes R73.03    Hyperglycemia R73.9    Anxiety F41.9    Depressive disorder F32.9    Seizure ([facility] [address] R56.9    Essential hypertension I10    Weight loss advised Z78.9     Patient Active Problem List    Diagnosis Date Noted    Essential hypertension 02/24/2021    Weight loss advised 02/24/2021    Hyperglycemia 05/30/2017    Anxiety 01/04/2017    Depressive disorder 01/04/2017    Seizure ([facility] [address] 01/04/2017    Prediabetes 07/01/2014    PCOS (polycystic ovarian syndrome) 05/14/2014    Secondary amenorrhea 07/26/2013    Hirsutism 07/26/2013    Hashimoto's disease     Hypothyroid 04/26/2013     Current Outpatient Medications   Medication Sig Dispense Refill    levothyroxine (SYNTHROID) 137 mcg tablet Take  by mouth Daily (before breakfast).  losartan (COZAAR) 100 mg tablet 1 tab po QD 90 Tablet 1    amLODIPine (NORVASC) 5 mg tablet Take 1 Tablet by mouth daily. 90 Tablet 1    Synthroid 150 mcg tablet Take 1 Tab by mouth Daily (before breakfast). Stop 137 mcg. Brand Medically Necessary (Patient not taking: Reported on 2021) 90 Tab 3     No Known Allergies  Past Medical History:   Diagnosis Date    Hashimoto's disease     Hypothyroid     PCOS (polycystic ovarian syndrome)     Seizure ([address] 2016     Past Surgical History:   Procedure Laterality Date    HX  SECTION      x2    HX CHOLECYSTECTOMY  10/2018     Family History   Problem Relation Age of Onset    Diabetes Mother     Diabetes Sister     Diabetes Sister      Social History     Tobacco Use    Smoking status: Never Smoker    Smokeless tobacco: Never Used   Substance Use Topics    Alcohol use: No           Review of Systems   Constitutional: Negative for fever. Respiratory: Negative for cough and shortness of breath. Cardiovascular: Negative for chest pain and palpitations. Gastrointestinal: Negative for constipation and diarrhea. Neurological: Negative for dizziness. Psychiatric/Behavioral: Negative for depression. The patient is not nervous/anxious and does not have insomnia. Physical Exam  Constitutional:       Appearance: Normal appearance. She is obese. Comments: As seen on video chat   HENT:      Head: Normocephalic and atraumatic. Comments: As seen on video chat     Nose: Nose normal.      Comments: As seen on video chat  Eyes:      Extraocular Movements: Extraocular movements intact. Comments: As seen on video chat   Neck:      Comments: As seen on video chat  Pulmonary:      Effort: Pulmonary effort is normal.   Musculoskeletal:      Cervical back: Normal range of motion. Neurological:      General: No focal deficit present. Mental Status: She is alert and oriented to person, place, and time. Comments: As seen on video chat   Psychiatric:         Mood and Affect: Mood normal.         Behavior: Behavior normal.         Thought Content: Thought content normal.         Judgment: Judgment normal.           ASSESSMENT and PLAN  Diagnoses and all orders for this visit:    1. Mixed anxiety and depressive disorder  Comments:  on no meds currently and does not feel as if she needs to be    2. Essential hypertension  Comments:  borderline readings in endocrine offices. Orders:  -     losartan (COZAAR) 100 mg tablet; 1 tab po QD  -     amLODIPine (NORVASC) 5 mg tablet; Take 1 Tablet by mouth daily. Senia Couch who was evaluated through a synchronous (real-time) audio-video encounter, and/or his healthcare decision maker, is aware that it is a billable service, with coverage as determined by his insurance carrier. He provided verbal consent to proceed: Yes, and patient identification was verified. It was conducted pursuant to the emergency declaration under the [address] [place] [address], [address] [place] [address] authority and the [employer] General Act. A caregiver was present when appropriate. Ability to conduct physical exam was limited. I was at home. The patient was at home.           Cameron Jones NP

## 2021-06-24 NOTE — PATIENT INSTRUCTIONS
High Blood Pressure: Care Instructions  Overview     It's normal for blood pressure to go up and down throughout the day. But if it stays up, you have high blood pressure. Another name for high blood pressure is hypertension. Despite what a lot of people think, high blood pressure usually doesn't cause headaches or make you feel dizzy or lightheaded. It usually has no symptoms. But it does increase your risk of stroke, heart attack, and other problems. You and your doctor will talk about your risks of these problems based on your blood pressure. Your doctor will give you a goal for your blood pressure. Your goal will be based on your health and your age. Lifestyle changes, such as eating healthy and being active, are always important to help lower blood pressure. You might also take medicine to reach your blood pressure goal.  Follow-up care is a key part of your treatment and safety. Be sure to make and go to all appointments, and call your doctor if you are having problems. It's also a good idea to know your test results and keep a list of the medicines you take. How can you care for yourself at home? Medical treatment  · If you stop taking your medicine, your blood pressure will go back up. You may take one or more types of medicine to lower your blood pressure. Be safe with medicines. Take your medicine exactly as prescribed. Call your doctor if you think you are having a problem with your medicine. · Talk to your doctor before you start taking aspirin every day. Aspirin can help certain people lower their risk of a heart attack or stroke. But taking aspirin isn't right for everyone, because it can cause serious bleeding. · See your doctor regularly. You may need to see the doctor more often at first or until your blood pressure comes down. · If you are taking blood pressure medicine, talk to your doctor before you take decongestants or anti-inflammatory medicine, such as ibuprofen.  Some of these medicines can raise blood pressure. · Learn how to check your blood pressure at home. Lifestyle changes  · Stay at a healthy weight. This is especially important if you put on weight around the waist. Losing even 10 pounds can help you lower your blood pressure. · If your doctor recommends it, get more exercise. Walking is a good choice. Bit by bit, increase the amount you walk every day. Try for at least 30 minutes on most days of the week. You also may want to swim, bike, or do other activities. · Avoid or limit alcohol. Talk to your doctor about whether you can drink any alcohol. · Try to limit how much sodium you eat to less than 2,300 milligrams (mg) a day. Your doctor may ask you to try to eat less than 1,500 mg a day. · Eat plenty of fruits (such as bananas and oranges), vegetables, legumes, whole grains, and low-fat dairy products. · Lower the amount of saturated fat in your diet. Saturated fat is found in animal products such as milk, cheese, and meat. Limiting these foods may help you lose weight and also lower your risk for heart disease. · Do not smoke. Smoking increases your risk for heart attack and stroke. If you need help quitting, talk to your doctor about stop-smoking programs and medicines. These can increase your chances of quitting for good. When should you call for help? Call  911 anytime you think you may need emergency care. This may mean having symptoms that suggest that your blood pressure is causing a serious heart or blood vessel problem. Your blood pressure may be over 180/120. For example, call 911 if:    · You have symptoms of a heart attack. These may include:  ? Chest pain or pressure, or a strange feeling in the chest.  ? Sweating. ? Shortness of breath. ? Nausea or vomiting. ? Pain, pressure, or a strange feeling in the back, neck, jaw, or upper belly or in one or both shoulders or arms. ? Lightheadedness or sudden weakness.   ? A fast or irregular heartbeat.     · You have symptoms of a stroke. These may include:  ? Sudden numbness, tingling, weakness, or loss of movement in your face, arm, or leg, especially on only one side of your body. ? Sudden vision changes. ? Sudden trouble speaking. ? Sudden confusion or trouble understanding simple statements. ? Sudden problems with walking or balance. ? A sudden, severe headache that is different from past headaches.     · You have severe back or belly pain. Do not wait until your blood pressure comes down on its own. Get help right away. Call your doctor now or seek immediate care if:    · Your blood pressure is much higher than normal (such as 180/120 or higher), but you don't have symptoms.     · You think high blood pressure is causing symptoms, such as:  ? Severe headache.  ? Blurry vision. Watch closely for changes in your health, and be sure to contact your doctor if:    · Your blood pressure measures higher than your doctor recommends at least 2 times. That means the top number is higher or the bottom number is higher, or both.     · You think you may be having side effects from your blood pressure medicine. Where can you learn more? Go to http://www.gray.com/  Enter [id number] in the search box to learn more about \"High Blood Pressure: Care Instructions. \"  Current as of: August 31, 2020               Content Version: 12.8  © 2006-2021 [employer]. Care instructions adapted under license by [employer] (which disclaims liability or warranty for this information). If you have questions about a medical condition or this instruction, always ask your healthcare professional. [place] [address] any warranty or liability for your use of this information.

## 2021-06-24 NOTE — PROGRESS NOTES
1. Have you been to the ER, urgent care clinic since your last visit? Hospitalized since your last visit? No    2. Have you seen or consulted any other health care providers outside of the [address] [place] [address] since your last visit? Include any pap smears or colon screening. No  Chief Complaint   Patient presents with    Medication Refill     [facility]     There were no vitals taken for this visit.

## 2021-07-12 DIAGNOSIS — I10 ESSENTIAL HYPERTENSION: Chronic | ICD-10-CM

## 2021-07-13 RX ORDER — LOSARTAN POTASSIUM 100 MG/1
TABLET ORAL
Qty: 90 TABLET | Refills: 0 | Status: SHIPPED | OUTPATIENT
Start: 2021-07-13 | End: 2021-08-31 | Stop reason: SDUPTHER

## 2021-08-15 DIAGNOSIS — E03.8 OTHER SPECIFIED HYPOTHYROIDISM: ICD-10-CM

## 2021-08-15 DIAGNOSIS — E06.3 AUTOIMMUNE THYROIDITIS: ICD-10-CM

## 2021-08-15 RX ORDER — LEVOTHYROXINE SODIUM 137 UG/1
TABLET ORAL
Qty: 90 TABLET | Refills: 3 | Status: SHIPPED | OUTPATIENT
Start: 2021-08-15 | End: 2021-09-20 | Stop reason: ALTCHOICE

## 2021-08-31 ENCOUNTER — OFFICE VISIT (OUTPATIENT)
Dept: PRIMARY CARE CLINIC | Age: 41
End: 2021-08-31
Payer: MEDICAID

## 2021-08-31 VITALS
RESPIRATION RATE: 16 BRPM | TEMPERATURE: 97.3 F | HEIGHT: 64 IN | BODY MASS INDEX: 33.29 KG/M2 | WEIGHT: 195 LBS | HEART RATE: 88 BPM | DIASTOLIC BLOOD PRESSURE: 73 MMHG | SYSTOLIC BLOOD PRESSURE: 119 MMHG

## 2021-08-31 DIAGNOSIS — I10 ESSENTIAL HYPERTENSION: Primary | Chronic | ICD-10-CM

## 2021-08-31 PROCEDURE — 99213 OFFICE O/P EST LOW 20 MIN: CPT | Performed by: NURSE PRACTITIONER

## 2021-08-31 RX ORDER — LOSARTAN POTASSIUM 100 MG/1
TABLET ORAL
Qty: 90 TABLET | Refills: 1 | Status: SHIPPED | OUTPATIENT
Start: 2021-08-31 | End: 2022-06-03 | Stop reason: SDUPTHER

## 2021-08-31 RX ORDER — AMLODIPINE BESYLATE 5 MG/1
5 TABLET ORAL DAILY
Qty: 90 TABLET | Refills: 1 | Status: SHIPPED | OUTPATIENT
Start: 2021-08-31 | End: 2022-06-03 | Stop reason: SDUPTHER

## 2021-08-31 NOTE — PROGRESS NOTES
HISTORY OF PRESENT ILLNESS  Robbie Rae is a 47 y.o. female presents for   Chief Complaint   Patient presents with    Follow-up     BP check   here for follow up on BP    Here for a follow up on Hypertension      [x]Bloodpressure well controlled at home and monitors with home device  [x]Denies Headache, Chest pain, Visual changes, Dizziness, and difficulty breathing  [x]Is taking current home meds as prescribed    Lab Results   Component Value Date/Time    Creatinine 0.84 11/07/2020 10:43 AM   ]  Lab Results   Component Value Date/Time    GFR est  11/07/2020 10:43 AM   ]  Lab Results   Component Value Date/Time    GFR est non-AA 88 11/07/2020 10:43 AM   ]    Takes: [ARBs] []Valsartan; [x]Losartan; [] Olmesartan;[] Irbesartan  [ACE inhibitors] []Lisinopril; []Enalapril; []Ramipril; []Benazapril  [CCB] [x]Amlodipine []Cardizem/Diltiazem  [Diuretics] []HCTZ; []Chlorthalidone; []Lasix; []Indapamide;  [B blockers][]Atenolol; []Metoprolol; []Propanolol; []Bystolic (nebivolol); []Carvediolol;    [other] []Clonidine tabs scheduled []Clonidine Tabs PRN []Clonidine patches  [] other [as listed on meds]      Vitals:    08/31/21 0912 08/31/21 0934   BP: 119/73    BP 1 Location: Right arm    BP Patient Position: Sitting    Pulse: (!) 104 88   Temp: 97.3 °F (36.3 °C)    TempSrc: Axillary    Resp: 16    Height: 5' 4\" (1.626 m)    Weight: 195 lb (88.5 kg)       Patient Active Problem List   Diagnosis Code    Hypothyroid E03.9    Hashimoto's disease E06.3    Secondary amenorrhea N91.1    Hirsutism L68.0    PCOS (polycystic ovarian syndrome) E28.2    Prediabetes R73.03    Hyperglycemia R73.9    Anxiety F41.9    Depressive disorder F32.9    Seizure ([address] R56.9    Essential hypertension I10    Weight loss advised Z78.9     Patient Active Problem List    Diagnosis Date Noted    Essential hypertension 02/24/2021    Weight loss advised 02/24/2021    Hyperglycemia 05/30/2017    Anxiety 01/04/2017    Depressive disorder 2017    [facility]    Prediabetes 2014    PCOS (polycystic ovarian syndrome) 2014    Secondary amenorrhea 2013    Hirsutism 2013    Hashimoto's disease     Hypothyroid 2013     Current Outpatient Medications   Medication Sig Dispense Refill    losartan (COZAAR) 100 mg tablet TAKE 1 TABLET BY MOUTH EVERY DAY 90 Tablet 1    amLODIPine (NORVASC) 5 mg tablet Take 1 Tablet by mouth daily. 90 Tablet 1    levothyroxine (SYNTHROID) 137 mcg tablet TAKE 137 MCG BY MOUTH DAILY (BEFORE BREAKFAST). STOP 100 MCG 90 Tablet 3     No Known Allergies  Past Medical History:   Diagnosis Date    Hashimoto's disease     Hypothyroid     PCOS (polycystic ovarian syndrome)     Seizure ([facility] 2016     Past Surgical History:   Procedure Laterality Date    HX  SECTION      x2    HX CHOLECYSTECTOMY  10/2018     Family History   Problem Relation Age of Onset    Diabetes Mother     Diabetes Sister     Diabetes Sister      Social History     Tobacco Use    Smoking status: Never Smoker    Smokeless tobacco: Never Used   Substance Use Topics    Alcohol use: No           ROS    Physical Exam      ASSESSMENT and PLAN  Diagnoses and all orders for this visit:    1. Essential hypertension  -     METABOLIC PANEL, BASIC  -     losartan (COZAAR) 100 mg tablet; TAKE 1 TABLET BY MOUTH EVERY DAY  -     amLODIPine (NORVASC) 5 mg tablet; Take 1 Tablet by mouth daily. 2. Essential hypertension  Comments:  borderline readings in endocrine offices. Orders:  -     METABOLIC PANEL, BASIC  -     losartan (COZAAR) 100 mg tablet; TAKE 1 TABLET BY MOUTH EVERY DAY  -     amLODIPine (NORVASC) 5 mg tablet; Take 1 Tablet by mouth daily. Candelaira Delgadillo NP         This office note has been dictated using voice capture. Despite evaluating for errors, syntax errors could be present.

## 2021-08-31 NOTE — PATIENT INSTRUCTIONS
High Blood Pressure: Care Instructions  Overview     It's normal for blood pressure to go up and down throughout the day. But if it stays up, you have high blood pressure. Another name for high blood pressure is hypertension. Despite what a lot of people think, high blood pressure usually doesn't cause headaches or make you feel dizzy or lightheaded. It usually has no symptoms. But it does increase your risk of stroke, heart attack, and other problems. You and your doctor will talk about your risks of these problems based on your blood pressure. Your doctor will give you a goal for your blood pressure. Your goal will be based on your health and your age. Lifestyle changes, such as eating healthy and being active, are always important to help lower blood pressure. You might also take medicine to reach your blood pressure goal.  Follow-up care is a key part of your treatment and safety. Be sure to make and go to all appointments, and call your doctor if you are having problems. It's also a good idea to know your test results and keep a list of the medicines you take. How can you care for yourself at home? Medical treatment  · If you stop taking your medicine, your blood pressure will go back up. You may take one or more types of medicine to lower your blood pressure. Be safe with medicines. Take your medicine exactly as prescribed. Call your doctor if you think you are having a problem with your medicine. · Talk to your doctor before you start taking aspirin every day. Aspirin can help certain people lower their risk of a heart attack or stroke. But taking aspirin isn't right for everyone, because it can cause serious bleeding. · See your doctor regularly. You may need to see the doctor more often at first or until your blood pressure comes down. · If you are taking blood pressure medicine, talk to your doctor before you take decongestants or anti-inflammatory medicine, such as ibuprofen.  Some of these medicines can raise blood pressure. · Learn how to check your blood pressure at home. Lifestyle changes  · Stay at a healthy weight. This is especially important if you put on weight around the waist. Losing even 10 pounds can help you lower your blood pressure. · If your doctor recommends it, get more exercise. Walking is a good choice. Bit by bit, increase the amount you walk every day. Try for at least 30 minutes on most days of the week. You also may want to swim, bike, or do other activities. · Avoid or limit alcohol. Talk to your doctor about whether you can drink any alcohol. · Try to limit how much sodium you eat to less than 2,300 milligrams (mg) a day. Your doctor may ask you to try to eat less than 1,500 mg a day. · Eat plenty of fruits (such as bananas and oranges), vegetables, legumes, whole grains, and low-fat dairy products. · Lower the amount of saturated fat in your diet. Saturated fat is found in animal products such as milk, cheese, and meat. Limiting these foods may help you lose weight and also lower your risk for heart disease. · Do not smoke. Smoking increases your risk for heart attack and stroke. If you need help quitting, talk to your doctor about stop-smoking programs and medicines. These can increase your chances of quitting for good. When should you call for help? Call  911 anytime you think you may need emergency care. This may mean having symptoms that suggest that your blood pressure is causing a serious heart or blood vessel problem. Your blood pressure may be over 180/120. For example, call 911 if:    · You have symptoms of a heart attack. These may include:  ? Chest pain or pressure, or a strange feeling in the chest.  ? Sweating. ? Shortness of breath. ? Nausea or vomiting. ? Pain, pressure, or a strange feeling in the back, neck, jaw, or upper belly or in one or both shoulders or arms. ? Lightheadedness or sudden weakness.   ? A fast or irregular heartbeat.     · You have symptoms of a stroke. These may include:  ? Sudden numbness, tingling, weakness, or loss of movement in your face, arm, or leg, especially on only one side of your body. ? Sudden vision changes. ? Sudden trouble speaking. ? Sudden confusion or trouble understanding simple statements. ? Sudden problems with walking or balance. ? A sudden, severe headache that is different from past headaches.     · You have severe back or belly pain. Do not wait until your blood pressure comes down on its own. Get help right away. Call your doctor now or seek immediate care if:    · Your blood pressure is much higher than normal (such as 180/120 or higher), but you don't have symptoms.     · You think high blood pressure is causing symptoms, such as:  ? Severe headache.  ? Blurry vision. Watch closely for changes in your health, and be sure to contact your doctor if:    · Your blood pressure measures higher than your doctor recommends at least 2 times. That means the top number is higher or the bottom number is higher, or both.     · You think you may be having side effects from your blood pressure medicine. Where can you learn more? Go to http://www.gray.com/  Enter [id number] in the search box to learn more about \"High Blood Pressure: Care Instructions. \"  Current as of: August 31, 2020               Content Version: 12.8  © 2006-2021 [employer]. Care instructions adapted under license by [employer] (which disclaims liability or warranty for this information). If you have questions about a medical condition or this instruction, always ask your healthcare professional. [place] [address] any warranty or liability for your use of this information.

## 2021-08-31 NOTE — PROGRESS NOTES
1. Have you been to the ER, urgent care clinic since your last visit? Hospitalized since your last visit? No    2. Have you seen or consulted any other health care providers outside of the [address] [place] [address] since your last visit? Include any pap smears or colon screening.  No   Visit Vitals  /73 (BP 1 Location: Right arm, BP Patient Position: Sitting)   Pulse (!) 104   Temp 97.3 °F (36.3 °C) (Axillary)   Resp 16   Ht 5' 4\" (1.626 m)   Wt 195 lb (88.5 kg)   BMI 33.47 kg/m²     Chief Complaint   Patient presents with    Follow-up     BP check

## 2021-09-14 LAB
T4 FREE SERPL-MCNC: 1.12 NG/DL (ref 0.82–1.77)
TSH SERPL DL<=0.005 MIU/L-ACNC: 10.4 UIU/ML (ref 0.45–4.5)

## 2021-09-20 ENCOUNTER — OFFICE VISIT (OUTPATIENT)
Dept: ENDOCRINOLOGY | Age: 41
End: 2021-09-20
Payer: MEDICAID

## 2021-09-20 VITALS
OXYGEN SATURATION: 98 % | BODY MASS INDEX: 33.55 KG/M2 | TEMPERATURE: 98.4 F | RESPIRATION RATE: 16 BRPM | WEIGHT: 196.5 LBS | SYSTOLIC BLOOD PRESSURE: 125 MMHG | DIASTOLIC BLOOD PRESSURE: 81 MMHG | HEART RATE: 101 BPM | HEIGHT: 64 IN

## 2021-09-20 DIAGNOSIS — L68.0 HIRSUTISM: ICD-10-CM

## 2021-09-20 DIAGNOSIS — E06.3 HYPOTHYROIDISM DUE TO HASHIMOTO'S THYROIDITIS: Primary | ICD-10-CM

## 2021-09-20 DIAGNOSIS — E06.3 HASHIMOTO'S DISEASE: ICD-10-CM

## 2021-09-20 DIAGNOSIS — E28.2 PCOS (POLYCYSTIC OVARIAN SYNDROME): ICD-10-CM

## 2021-09-20 DIAGNOSIS — E03.8 HYPOTHYROIDISM DUE TO HASHIMOTO'S THYROIDITIS: Primary | ICD-10-CM

## 2021-09-20 PROCEDURE — 99214 OFFICE O/P EST MOD 30 MIN: CPT | Performed by: INTERNAL MEDICINE

## 2021-09-20 RX ORDER — LEVOTHYROXINE SODIUM 150 UG/1
150 TABLET ORAL
Qty: 90 TABLET | Refills: 3 | Status: SHIPPED | OUTPATIENT
Start: 2021-09-20 | End: 2022-09-27

## 2021-09-20 NOTE — Clinical Note
9/20/2021    Patient: Asim Eli   YOB: 1980   Date of Visit: 9/20/2021     Antonio Barr NP  Via     Dear Antonio Barr NP,      Thank you for referring Ms. Suad Adrian to [address] [place] [address] for evaluation. My notes for this consultation are attached. If you have questions, please do not hesitate to call me. I look forward to following your patient along with you.       Sincerely,    Yesenia Álvarez MD

## 2021-09-20 NOTE — PROGRESS NOTES
Lori Olson is a 47 y.o. female here for   Chief Complaint   Patient presents with    Thyroid Problem    PCOS       1. Have you been to the ER, urgent care clinic since your last visit? Hospitalized since your last visit? - no    2. Have you seen or consulted any other health care providers outside of the [address] [place] [address] since your last visit?   Include any pap smears or colon screening.- no

## 2021-09-21 NOTE — PROGRESS NOTES
Chief complaint: PCOS    History of present illness    Spiceland Finder is a 47 y.o. female  here for fu of  Hypothyroidism and PCOS    She is on LT4 consistently , could not get brand medication  Could not tolerate immediate release Metformin,  TSH is fluctuating, reports compliance  Has underlying GERD  No weight loss    She has seen ENT, for odynophagia, PPI helped,   Zantac did not help   Off OCPs because of left Ovarian vein thrombosis   Having cycles on her own      No history of known radiation exposure    No FH of thyroid disease. No FH of thyroid cancer     Wt Readings from Last 3 Encounters:   09/20/21 196 lb 8 oz (89.1 kg)   08/31/21 195 lb (88.5 kg)   05/17/21 197 lb (89.4 kg)       Past Medical History:   Diagnosis Date    Hashimoto's disease     Hypothyroid     PCOS (polycystic ovarian syndrome)     Seizure ([facility] 12/2016     Wt Readings from Last 3 Encounters:   09/20/21 196 lb 8 oz (89.1 kg)   08/31/21 195 lb (88.5 kg)   05/17/21 197 lb (89.4 kg)     Current Outpatient Medications   Medication Sig    Unithroid 150 mcg tablet Take 1 Tablet by mouth Daily (before breakfast).  losartan (COZAAR) 100 mg tablet TAKE 1 TABLET BY MOUTH EVERY DAY    amLODIPine (NORVASC) 5 mg tablet Take 1 Tablet by mouth daily. No current facility-administered medications for this visit. Review of Systems:  - Review of systems negative other than mentioned in HPI  -     Physical Examination:  - Blood pressure 125/81, pulse (!) 101, temperature 98.4 °F (36.9 °C), temperature source Temporal, resp. rate 16, height 5' 4\" (1.626 m), weight 196 lb 8 oz (89.1 kg), SpO2 98 %. Body mass index is 33.73 kg/m².   - General: pleasant, no distress, good eye contact  - HEENT: no exophthalmos, no periorbital edema, EOMI  - Neck: No thyromegaly  - CVS: S1-S2 regular  - RS: Normal respiratory effort  - Musculoskeletal: no tremors  - Neurological: alert and oriented  - Psychiatric: normal mood and affect  - Skin: Normal color    Data Reviewed:   1/13 TSH 21  Prolactin 14  FSH 5.5    Lab Results   Component Value Date/Time    TSH 10.400 (H) 09/13/2021 12:00 AM    T4, Free 1.12 09/13/2021 12:00 AM      Component      Latest Ref Rng 11/5/2013 9/9/2013   Luteinizing hormone        9.7   FSH        5.0   TSH      0.450 - 4.500 uIU/mL 3.660 0.189 (L)   Testosterone, Serum (Total)      10.0 - 55.0 ng/dL  44.0   Prolactin      4.8 - 23.3 ng/mL  9.8   Estradiol        46.0   DHEA Sulfate      98.8 - 340.0 ug/dL  129.2     Lab Results   Component Value Date/Time    Hemoglobin A1c 6.2 (H) 05/08/2021 11:29 AM       [x] Reviewed labs    Assessment/Plan:     1. Hypothyroidism due to Hashimoto's thyroiditis    2. Hashimoto's disease    3. PCOS (polycystic ovarian syndrome)    4. Hirsutism        GERD-seen ENT  PPI helped      Primary hypothyroidism /Hashimoto's thyroditis  Synthroid: Brand not covered by insurance  Unithroid 150 mcg, good Rx coupon    US - no nodules 7/14  On LT4 - compliance discussed      HTN -not at goal    PCOS /Prediabetes-  Hx of DVT - no OCPs    Metformin - diarrhea, changed to ER reports headache   Off Aldactone  off Provera   No phentermine due to uncontrolled hypertension  Discussed about weight loss, intermittent fasting, it is difficult to get weekly GLP-1 agonist with Medicaid      Hypertriglyceridemia - on tricor    Seizures - on keppra    Thank you for allowing me to participate in the care of this patient. Ray Paniagua MD      Patient verbalized understanding    Voice-recognition software was used to generate this report, which may result in some phonetic-based errors in the grammar and contents. Even though attempts were made to correct all the mistakes, some may have been missed and remained in the body of the report.

## 2021-12-27 ENCOUNTER — OFFICE VISIT (OUTPATIENT)
Dept: PRIMARY CARE CLINIC | Age: 41
End: 2021-12-27
Payer: MEDICAID

## 2021-12-27 ENCOUNTER — TELEPHONE (OUTPATIENT)
Dept: PRIMARY CARE CLINIC | Age: 41
End: 2021-12-27

## 2021-12-27 VITALS — HEIGHT: 64 IN | OXYGEN SATURATION: 84 % | BODY MASS INDEX: 33.73 KG/M2 | HEART RATE: 62 BPM | TEMPERATURE: 96.9 F

## 2021-12-27 DIAGNOSIS — Z20.822 SUSPECTED COVID-19 VIRUS INFECTION: ICD-10-CM

## 2021-12-27 DIAGNOSIS — J06.9 VIRAL UPPER RESPIRATORY ILLNESS: Primary | ICD-10-CM

## 2021-12-27 PROCEDURE — 99213 OFFICE O/P EST LOW 20 MIN: CPT | Performed by: NURSE PRACTITIONER

## 2021-12-27 RX ORDER — PROMETHAZINE HYDROCHLORIDE AND DEXTROMETHORPHAN HYDROBROMIDE 6.25; 15 MG/5ML; MG/5ML
5 SYRUP ORAL
Qty: 150 ML | Refills: 0 | Status: SHIPPED | OUTPATIENT
Start: 2021-12-27 | End: 2022-01-03

## 2021-12-27 RX ORDER — METHYLPREDNISOLONE 4 MG/1
TABLET ORAL
Qty: 1 DOSE PACK | Refills: 0 | Status: SHIPPED | OUTPATIENT
Start: 2021-12-27 | End: 2022-06-03

## 2021-12-27 NOTE — LETTER
NOTIFICATION RETURN TO WORK / SCHOOL    12/27/2021 1:13 PM    Ms. Christoph Atwood [address] [facility] [id number]      To Whom It May Concern:    Symone Dacosta is currently under the care of Anjali Mayo. She will return to work/school on:pending results of her covid test that were taken on 12/27/21. If there are questions or concerns please have the patient contact our office.         Sincerely,      Brianna Bailon NP

## 2021-12-27 NOTE — PROGRESS NOTES
HISTORY OF PRESENT ILLNESS  Harry Quiñones is a 46 y.o. female presents for upper respiratory illness. Patient reported she woke up x2 days ago with dry cough and bilateral ear pain (right worse then left). She denies congestion, runny nose, sore throat, GI symptoms or loss of smell or taste. She dose note fever on Day 1 of illness that has resolved. Has been vaccinated for covid but has not received booster. Has no known sick contacts. Vitals:    12/27/21 1311   Pulse: 62   Temp: 96.9 °F (36.1 °C)   TempSrc: Temporal   SpO2: (!) 84%   Height: 5' 4\" (1.626 m)     Patient Active Problem List   Diagnosis Code    Hypothyroid E03.9    Hashimoto's disease E06.3    Secondary amenorrhea N91.1    Hirsutism L68.0    PCOS (polycystic ovarian syndrome) E28.2    Prediabetes R73.03    Hyperglycemia R73.9    Anxiety F41.9    Depressive disorder F32.9    Seizure (HCC) R56.9    Essential hypertension I10    Weight loss advised Z78.9     Patient Active Problem List    Diagnosis Date Noted    Essential hypertension 02/24/2021    Weight loss advised 02/24/2021    Hyperglycemia 05/30/2017    Anxiety 01/04/2017    Depressive disorder 01/04/2017    Seizure ([address] 01/04/2017    Prediabetes 07/01/2014    PCOS (polycystic ovarian syndrome) 05/14/2014    Secondary amenorrhea 07/26/2013    Hirsutism 07/26/2013    Hashimoto's disease     Hypothyroid 04/26/2013     Current Outpatient Medications   Medication Sig Dispense Refill    methylPREDNISolone (MEDROL DOSEPACK) 4 mg tablet Use as directed for 6 days. 1 Dose Pack 0    promethazine-dextromethorphan (PROMETHAZINE-DM) 6.25-15 mg/5 mL syrup Take 5 mL by mouth every four (4) hours as needed for Cough for up to 7 days. 150 mL 0    Unithroid 150 mcg tablet Take 1 Tablet by mouth Daily (before breakfast).  90 Tablet 3    losartan (COZAAR) 100 mg tablet TAKE 1 TABLET BY MOUTH EVERY DAY 90 Tablet 1    amLODIPine (NORVASC) 5 mg tablet Take 1 Tablet by mouth daily. 90 Tablet 1     No Known Allergies  Past Medical History:   Diagnosis Date    Hashimoto's disease     Hypothyroid     PCOS (polycystic ovarian syndrome)     Seizure ([address] 2016     Past Surgical History:   Procedure Laterality Date    HX  SECTION      x2    HX CHOLECYSTECTOMY  10/2018     Family History   Problem Relation Age of Onset    Diabetes Mother     Diabetes Sister     Diabetes Sister      Social History     Tobacco Use    Smoking status: Never Smoker    Smokeless tobacco: Never Used   Substance Use Topics    Alcohol use: No           Review of Systems   Constitutional: Positive for fever. Negative for chills and malaise/fatigue. HENT: Positive for ear pain. Negative for congestion, ear discharge, sinus pain, sore throat and tinnitus. Respiratory: Positive for cough. Negative for sputum production, shortness of breath and wheezing. Cardiovascular: Negative for chest pain and palpitations. Gastrointestinal: Negative. Musculoskeletal: Negative for joint pain and myalgias. Neurological: Negative for dizziness and headaches. Physical Exam  Constitutional:       Appearance: Normal appearance. HENT:      Head: Normocephalic. Right Ear: Ear canal and external ear normal. A middle ear effusion is present. Tympanic membrane is scarred. Left Ear: Ear canal and external ear normal. A middle ear effusion is present. Nose: Nose normal.      Mouth/Throat:      Mouth: Mucous membranes are moist.      Pharynx: Oropharynx is clear. Eyes:      Conjunctiva/sclera: Conjunctivae normal.   Cardiovascular:      Rate and Rhythm: Normal rate and regular rhythm. Pulses: Normal pulses. Heart sounds: Normal heart sounds. Pulmonary:      Effort: Pulmonary effort is normal.   Skin:     General: Skin is warm and dry. Neurological:      Mental Status: She is alert and oriented to person, place, and time.    Psychiatric:         Mood and Affect: Mood normal. Behavior: Behavior normal.           ASSESSMENT and PLAN  Diagnoses and all orders for this visit:    1. Viral upper respiratory illness  Comments:  treat with steroids due to ear effusion/dry cough. Orders:  -     methylPREDNISolone (MEDROL DOSEPACK) 4 mg tablet; Use as directed for 6 days. -     promethazine-dextromethorphan (PROMETHAZINE-DM) 6.25-15 mg/5 mL syrup; Take 5 mL by mouth every four (4) hours as needed for Cough for up to 7 days. 2. Suspected COVID-19 virus infection  -     NOVEL CORONAVIRUS (COVID-19)     Patients SpO2 noted to be 84% after she had left from appointment. Patient evaluated outside in her car in cold temperatures. Patient's lung exam was normal, she was speaking in complete sentences and did not complain of SOB. She left before able to recheck SpO2. Discussed expected course/resolution/complications of diagnosis in detail with patient. Advised pt on CDC guidance, OTC medications for symptom management, red flags reviewed and should develop to seek emergency medical attention.      Chaitanya Carolina NP

## 2021-12-27 NOTE — PROGRESS NOTES
Chief Complaint   Patient presents with    Cough     Pt states having cough and ear pain since saturday       1. Have you been to the ER, urgent care clinic since your last visit? Hospitalized since your last visit?no    2. Have you seen or consulted any other health care providers outside of the [address] [place] [address] since your last visit? Include any pap smears or colon screening.  no    Visit Vitals  Pulse 62   Temp 96.9 °F (36.1 °C) (Temporal)   Ht 5' 4\" (1.626 m)   SpO2 (!) 84%   BMI 33.73 kg/m²

## 2021-12-27 NOTE — PATIENT INSTRUCTIONS
Most viral illnesses resolve within 10-14 days. Please call back to the office if no improvement in 14 days from onset of symptoms. Symptomatic therapy suggested:   Tylenol and/or motrin for fever, pain, discomfort. Cough suppressant of choice for cough  Increase fluids and rest as much as possible. Use vaporizer or stay in steamy bathroom for 15 minutes as needed for congestion/cough. Apply facial warm packs for sinus pain or use nasal saline sprays. Discussed lack of effectiveness of antibiotics for viral illnesses.

## 2021-12-27 NOTE — TELEPHONE ENCOUNTER
----- Message from [facility] sent at 12/27/2021  8:51 AM EST -----  Subject: Appointment Request    Reason for Call: Semi-Routine Cough, Cold Symptoms    QUESTIONS  Type of Appointment? New Patient/New to Provider  Reason for appointment request? No appointments available during search  Additional Information for Provider? Pt needs to schedule appt for cough,   earache, sore throat. Her prev PCP is gone; unable to sched through [facility] as   prev PCP is still listed in [place].  ---------------------------------------------------------------------------  --------------  CALL BACK INFO  What is the best way for the office to contact you? OK to leave message on   voicemail  Preferred Call Back Phone Number? [phone]  ---------------------------------------------------------------------------  --------------  SCRIPT ANSWERS  Relationship to Patient? Self  Specialty Confirmation? Primary Care  Are you currently unable to finish sentences due to any difficulty   breathing? No  Are you unable to swallow liquids? No  Are you having fevers (100.4 or greater), chills, or sweats? No  Do you have COPD, asthma or a chronic lung condition? No  Have your symptoms been present for more than 5 days? No  Have you recently (14 days) been seen by a provider for this issue? No  Have you been diagnosed with, awaiting test results for, or told that you   are suspected of having COVID-19 (Coronavirus)? (If patient has tested   negative or was tested as a requirement for work, school, or travel and   not based on symptoms, answer no)? No  Within the past two weeks have you developed any of the following symptoms   (answer no if symptoms have been present longer than 2 weeks or began   more than 2 weeks ago)?  Fever or Chills, Cough, Shortness of breath or   difficulty breathing, Loss of taste or smell, Sore throat, Nasal   congestion, Sneezing or runny nose, Fatigue or generalized body aches   (answer no if pain is specific to a body part e.g. back pain), Diarrhea,   Headache?  Yes

## 2021-12-29 ENCOUNTER — PATIENT MESSAGE (OUTPATIENT)
Dept: PRIMARY CARE CLINIC | Age: 41
End: 2021-12-29

## 2021-12-29 LAB
SARS-COV-2, NAA 2 DAY TAT: NORMAL
SARS-COV-2, NAA: DETECTED

## 2021-12-29 NOTE — LETTER
NOTIFICATION RETURN TO WORK / SCHOOL    12/30/2021 7:33 AM    Ms. Christoph Atwood [address] [facility] [address]      To Whom It May Concern:    Phil Bennett is currently under the care of Anjali Phan 1996. She will return to work/school on:  January 4th 2022    If there are questions or concerns please have the patient contact our office.         Sincerely,      Leighton Martinez NP

## 2021-12-30 NOTE — TELEPHONE ENCOUNTER
From: Nicolasa Link  To: Chaitanya Carolina NP  Sent: 12/29/2021 3:15 PM EST  Subject: Question regarding NOVEL CORONAVIRUS (COVID-19)    Does this mean postive

## 2022-01-03 ENCOUNTER — VIRTUAL VISIT (OUTPATIENT)
Dept: ENDOCRINOLOGY | Age: 42
End: 2022-01-03
Payer: MEDICAID

## 2022-01-03 DIAGNOSIS — E06.3 HYPOTHYROIDISM DUE TO HASHIMOTO'S THYROIDITIS: ICD-10-CM

## 2022-01-03 DIAGNOSIS — E06.3 HASHIMOTO'S DISEASE: Primary | ICD-10-CM

## 2022-01-03 DIAGNOSIS — L68.0 HIRSUTISM: ICD-10-CM

## 2022-01-03 DIAGNOSIS — E28.2 PCOS (POLYCYSTIC OVARIAN SYNDROME): ICD-10-CM

## 2022-01-03 DIAGNOSIS — E03.8 HYPOTHYROIDISM DUE TO HASHIMOTO'S THYROIDITIS: ICD-10-CM

## 2022-01-03 DIAGNOSIS — R73.03 PREDIABETES: ICD-10-CM

## 2022-01-03 PROCEDURE — 99215 OFFICE O/P EST HI 40 MIN: CPT | Performed by: INTERNAL MEDICINE

## 2022-01-03 NOTE — PROGRESS NOTES
Pursuant to the emergency declaration under the [address] waiver authority and the [employer] Act, this Virtual  Visit was conducted, with patient's consent, to reduce the patient's risk of exposure to COVID-19 . Patient  is aware that this is a billable encounter and is responsible for copays/deductibles       Services were provided through a video synchronous discussion virtually to substitute for in-person clinic visit. Place of service: Provider : [facility] diabetes endocrinology office  Patient: Home        Chief complaint: PCOS and hypothyroidism    History of present illness    Celeste Sylvester is a 46 y.o. female  here for fu of  Hypothyroidism and PCOS  Diagnosed with Covid December 27th  She is on LT4 consistently , now on Unithroid  Could not tolerate immediate release Metformin,  Lost weight  Has underlying GERD  Weight loss, walking all day at work , drinking more water     She has seen ENT, for odynophagia, PPI helped,   Zantac did not help   Off OCPs because of left Ovarian vein thrombosis   Having cycles on her own, could not tolerate Metformin      No history of known radiation exposure    No FH of thyroid disease. No FH of thyroid cancer     Wt Readings from Last 3 Encounters:   09/20/21 196 lb 8 oz (89.1 kg)   08/31/21 195 lb (88.5 kg)   05/17/21 197 lb (89.4 kg)       Past Medical History:   Diagnosis Date    Hashimoto's disease     Hypothyroid     PCOS (polycystic ovarian syndrome)     Se[address] [address] 12/2016     Wt Readings from Last 3 Encounters:   09/20/21 196 lb 8 oz (89.1 kg)   08/31/21 195 lb (88.5 kg)   05/17/21 197 lb (89.4 kg)     Current Outpatient Medications   Medication Sig    Unithroid 150 mcg tablet Take 1 Tablet by mouth Daily (before breakfast).  losartan (COZAAR) 100 mg tablet TAKE 1 TABLET BY MOUTH EVERY DAY    amLODIPine (NORVASC) 5 mg tablet Take 1 Tablet by mouth daily.     methylPREDNISolone (MEDROL DOSEPACK) 4 mg tablet Use as directed for 6 days.  promethazine-dextromethorphan (PROMETHAZINE-DM) 6.25-15 mg/5 mL syrup Take 5 mL by mouth every four (4) hours as needed for Cough for up to 7 days. No current facility-administered medications for this visit. Review of Systems:  - Per HPI  -     Physical Examination:  - There were no vitals taken for this visit. There is no height or weight on file to calculate BMI. - General: pleasant, no distress, good eye contact  - HEENT: no exophthalmos, no periorbital edema, EOMI  - Neck: No visible thyromegaly  - RS: Normal respiratory effort  - Musculoskeletal: no tremors  - Neurological: alert and oriented  - Psychiatric: normal mood and affect  - Skin: Normal color    Data Reviewed:   1/13 TSH 21  Prolactin 14  FSH 5.5    Lab Results   Component Value Date/Time    TSH 10.400 (H) 09/13/2021 12:00 AM    T4, Free 1.12 09/13/2021 12:00 AM      Component      Latest Ref Rng 11/5/2013 9/9/2013   Luteinizing hormone        9.7   FSH        5.0   TSH      0.450 - 4.500 uIU/mL 3.660 0.189 (L)   Testosterone, Serum (Total)      10.0 - 55.0 ng/dL  44.0   Prolactin      4.8 - 23.3 ng/mL  9.8   Estradiol        46.0   DHEA Sulfate      98.8 - 340.0 ug/dL  129.2     Lab Results   Component Value Date/Time    Hemoglobin A1c 6.2 (H) 05/08/2021 11:29 AM       [x] Reviewed labs    Assessment/Plan:     1. Hashimoto's disease    2. Hypothyroidism due to Hashimoto's thyroiditis    3. PCOS (polycystic ovarian syndrome)    4. Hirsutism    5.  Prediabetes        GERD-seen ENT  PPI helped      Primary hypothyroidism /Hashimoto's thyroditis  Synthroid: Brand not covered by insurance  Unithroid 150 mcg, good Rx coupon  179 lbs is her current weight    US - no nodules 7/14  On LT4 - compliance discussed      HTN -not at goal    PCOS /Prediabetes-  Hx of DVT - no OCPs    Metformin - diarrhea, changed to ER reports headache   Off Aldactone  off Provera   No phentermine due to uncontrolled hypertension  Discussed about weight loss, intermittent fasting, it is difficult to get weekly GLP-1 agonist with Medicaid  LMP April 2021 - Gyn, follow-up with gynecologist    Hypertriglyceridemia - off tricor    Seizures - off keppra    Spent > 40 minutes on the day of the visit reviewing chart, examining, ordering/reviewing labs, counseling, discussing therapeutics and documentation in the medical record    Thank you for allowing me to participate in the care of this patient. Isauro Gaines MD      Patient verbalized understanding    Voice-recognition software was used to generate this report, which may result in some phonetic-based errors in the grammar and contents. Even though attempts were made to correct all the mistakes, some may have been missed and remained in the body of the report.

## 2022-02-01 LAB
EST. AVERAGE GLUCOSE BLD GHB EST-MCNC: 120 MG/DL
HBA1C MFR BLD: 5.8 % (ref 4.8–5.6)
T3 SERPL-MCNC: 140 NG/DL (ref 71–180)
TSH SERPL-ACNC: 0.04 UIU/ML (ref 0.45–4.5)

## 2022-03-19 PROBLEM — R56.9 SEIZURE (HCC): Status: ACTIVE | Noted: 2017-01-04

## 2022-03-19 PROBLEM — F41.9 ANXIETY: Status: ACTIVE | Noted: 2017-01-04

## 2022-03-19 PROBLEM — Z78.9 WEIGHT LOSS ADVISED: Status: ACTIVE | Noted: 2021-02-24

## 2022-03-19 PROBLEM — I10 ESSENTIAL HYPERTENSION: Status: ACTIVE | Noted: 2021-02-24

## 2022-03-19 PROBLEM — F32.A DEPRESSIVE DISORDER: Status: ACTIVE | Noted: 2017-01-04

## 2022-03-20 PROBLEM — R73.9 HYPERGLYCEMIA: Status: ACTIVE | Noted: 2017-05-30

## 2022-06-03 ENCOUNTER — OFFICE VISIT (OUTPATIENT)
Dept: ENDOCRINOLOGY | Age: 42
End: 2022-06-03
Payer: MEDICAID

## 2022-06-03 VITALS
SYSTOLIC BLOOD PRESSURE: 119 MMHG | BODY MASS INDEX: 31.07 KG/M2 | TEMPERATURE: 97.6 F | WEIGHT: 182 LBS | HEIGHT: 64 IN | HEART RATE: 79 BPM | OXYGEN SATURATION: 98 % | DIASTOLIC BLOOD PRESSURE: 73 MMHG | RESPIRATION RATE: 18 BRPM

## 2022-06-03 DIAGNOSIS — I10 ESSENTIAL HYPERTENSION: Chronic | ICD-10-CM

## 2022-06-03 DIAGNOSIS — E06.3 HASHIMOTO'S DISEASE: Primary | ICD-10-CM

## 2022-06-03 DIAGNOSIS — E28.2 PCOS (POLYCYSTIC OVARIAN SYNDROME): ICD-10-CM

## 2022-06-03 DIAGNOSIS — E03.8 HYPOTHYROIDISM DUE TO HASHIMOTO'S THYROIDITIS: ICD-10-CM

## 2022-06-03 DIAGNOSIS — E06.3 HYPOTHYROIDISM DUE TO HASHIMOTO'S THYROIDITIS: ICD-10-CM

## 2022-06-03 PROCEDURE — 99214 OFFICE O/P EST MOD 30 MIN: CPT | Performed by: INTERNAL MEDICINE

## 2022-06-03 RX ORDER — LOSARTAN POTASSIUM 100 MG/1
TABLET ORAL
Qty: 90 TABLET | Refills: 0 | Status: SHIPPED | OUTPATIENT
Start: 2022-06-03 | End: 2022-06-15 | Stop reason: SDUPTHER

## 2022-06-03 RX ORDER — AMLODIPINE BESYLATE 5 MG/1
5 TABLET ORAL DAILY
Qty: 90 TABLET | Refills: 0 | Status: SHIPPED | OUTPATIENT
Start: 2022-06-03 | End: 2022-06-15 | Stop reason: SDUPTHER

## 2022-06-03 NOTE — PROGRESS NOTES
Chief complaint: PCOS and hypothyroidism    History of present illness    Esa Simpson is a 46 y.o. female  here for fu of  Hypothyroidism and PCOS  Diagnosed with Covid December 27th  She is on LT4 consistently , now on Unithroid  Could not tolerate immediate release Metformin,  Has underlying GERD  Weight loss, walking all day at work , drinking more water     Prior history  She has seen ENT, for odynophagia, PPI helped,   Zantac did not help   Off OCPs because of left Ovarian vein thrombosis   Having cycles on her own, could not tolerate Metformin      No history of known radiation exposure    No FH of thyroid disease. No FH of thyroid cancer     Wt Readings from Last 3 Encounters:   06/03/22 182 lb (82.6 kg)   09/20/21 196 lb 8 oz (89.1 kg)   08/31/21 195 lb (88.5 kg)       Past Medical History:   Diagnosis Date    Hashimoto's disease     Hypothyroid     PCOS (polycystic ovarian syndrome)     Seizure [address] [address] 12/2016     Wt Readings from Last 3 Encounters:   06/03/22 182 lb (82.6 kg)   09/20/21 196 lb 8 oz (89.1 kg)   08/31/21 195 lb (88.5 kg)     Current Outpatient Medications   Medication Sig    Unithroid 150 mcg tablet Take 1 Tablet by mouth Daily (before breakfast).  losartan (COZAAR) 100 mg tablet TAKE 1 TABLET BY MOUTH EVERY DAY    amLODIPine (NORVASC) 5 mg tablet Take 1 Tablet by mouth daily. No current facility-administered medications for this visit. Review of Systems:  - Per HPI  -     Physical Examination:  - Blood pressure 119/73, pulse 79, temperature 97.6 °F (36.4 °C), temperature source Temporal, resp. rate 18, height 5' 4\" (1.626 m), weight 182 lb (82.6 kg), SpO2 98 %. Body mass index is 31.24 kg/m².   - General: pleasant, no distress, good eye contact  - HEENT: no exophthalmos, no periorbital edema, EOMI  - Neck: No visible thyromegaly  - RS: Normal respiratory effort  - Musculoskeletal: no tremors  - Neurological: alert and oriented  - Psychiatric: normal mood and affect  - Skin: Normal color    Data Reviewed:   1/13 TSH 21  Prolactin 14  FSH 5.5    Lab Results   Component Value Date/Time    TSH 0.039 (L) 01/31/2022 12:00 AM    T4, Free 1.12 09/13/2021 12:00 AM      Component      Latest Ref Rng 11/5/2013 9/9/2013   Luteinizing hormone        9.7   FSH        5.0   TSH      0.450 - 4.500 uIU/mL 3.660 0.189 (L)   Testosterone, Serum (Total)      10.0 - 55.0 ng/dL  44.0   Prolactin      4.8 - 23.3 ng/mL  9.8   Estradiol        46.0   DHEA Sulfate      98.8 - 340.0 ug/dL  129.2     Lab Results   Component Value Date/Time    Hemoglobin A1c 5.8 (H) 01/31/2022 12:00 AM       [x] Reviewed labs    Assessment/Plan:     1. Hashimoto's disease    2. Hypothyroidism due to Hashimoto's thyroiditis    3. PCOS (polycystic ovarian syndrome)        GERD-seen ENT  PPI helped      Primary hypothyroidism /Hashimoto's thyroditis  Synthroid: Brand not covered by insurance  Unithroid 150 mcg, good Rx coupon  Continue  Controlled    US - no nodules 7/14  On LT4 - compliance discussed      HTN -  PCOS /Prediabetes-  Hx of DVT - no OCPs    Metformin - diarrhea, changed to ER reports headache   Off Aldactone  off Provera   No phentermine due to uncontrolled hypertension  Discussed about weight loss, intermittent fasting, it is difficult to get weekly GLP-1 agonist with Medicaid      Hypertriglyceridemia - off tricor    Seizures - off keppra        Thank you for allowing me to participate in the care of this patient. Nixon Jimenez MD      Patient verbalized understanding    Voice-recognition software was used to generate this report, which may result in some phonetic-based errors in the grammar and contents. Even though attempts were made to correct all the mistakes, some may have been missed and remained in the body of the report.

## 2022-06-03 NOTE — LETTER
6/4/2022    Patient: Asim Eli   YOB: 1980   Date of Visit: 6/3/2022     Dayami Ellis MD  [facility]  Mail Stop [address] [address]  Via Fax: [phone]    Dear Dayami Ellis MD,      Thank you for referring Ms. Suad Adrian to [address] for evaluation. My notes for this consultation are attached. If you have questions, please do not hesitate to call me. I look forward to following your patient along with you.       Sincerely,    Yesenia Álvarez MD

## 2022-06-03 NOTE — TELEPHONE ENCOUNTER
Pt made next available but will run out of the two meds before then she was scheduled with july but asked to see you

## 2022-06-03 NOTE — PROGRESS NOTES
Rosanna Santiago is a 46 y.o. female here for   Chief Complaint   Patient presents with    Thyroid Problem    PCOS       1. Have you been to the ER, urgent care clinic since your last visit? Hospitalized since your last visit? -no    2. Have you seen or consulted any other health care providers outside of the [address] [place] [address] since your last visit?   Include any pap smears or colon screening.-no

## 2022-06-04 LAB
EST. AVERAGE GLUCOSE BLD GHB EST-MCNC: 123 MG/DL
HBA1C MFR BLD: 5.9 % (ref 4.8–5.6)
T3 SERPL-MCNC: 147 NG/DL (ref 71–180)
TSH SERPL DL<=0.005 MIU/L-ACNC: 0.1 UIU/ML (ref 0.45–4.5)

## 2022-06-08 ENCOUNTER — HOSPITAL ENCOUNTER (EMERGENCY)
Age: 42
Discharge: HOME | End: 2022-06-08
Attending: EMERGENCY MEDICINE
Payer: MEDICAID

## 2022-06-08 VITALS
TEMPERATURE: 98.9 F | DIASTOLIC BLOOD PRESSURE: 89 MMHG | SYSTOLIC BLOOD PRESSURE: 124 MMHG | BODY MASS INDEX: 31.58 KG/M2 | WEIGHT: 185 LBS | HEART RATE: 90 BPM | OXYGEN SATURATION: 98 % | HEIGHT: 64 IN | RESPIRATION RATE: 16 BRPM

## 2022-06-08 DIAGNOSIS — R22.2 CHEST WALL MASS: Primary | ICD-10-CM

## 2022-06-08 PROCEDURE — 99283 EMERGENCY DEPT VISIT LOW MDM: CPT

## 2022-06-08 RX ORDER — DOXYCYCLINE HYCLATE 100 MG
100 TABLET ORAL 2 TIMES DAILY
Qty: 14 TABLET | Refills: 0 | Status: SHIPPED | OUTPATIENT
Start: 2022-06-08 | End: 2022-06-15

## 2022-06-09 NOTE — ED PROVIDER NOTES
Date of Service:  2022    Patient:  Kelli Hallman    Chief Complaint:  Cyst       HPI:  Kelli Hallman is a 46 y.o.  female who presents for evaluation of past.  Patient states that for about a year she has had a small nontender firm little cystic structure just to the medial side of her right breast.  She states over the last several days is gotten bigger and more painful. She denies other acute complaints modifying factors. She has had to have cyst removed in the past.  She denies any type of drainage. No other acute complaints           Past Medical History:   Diagnosis Date    Hashimoto's disease     Hypertension     Hypothyroid     PCOS (polycystic ovarian syndrome)     Seizure ([address] 2016    Seizures (HCC)        Past Surgical History:   Procedure Laterality Date    HX  SECTION      x2    HX CHOLECYSTECTOMY  10/2018         Family History:   Problem Relation Age of Onset    Diabetes Mother     Diabetes Sister     Diabetes Sister        Social History     Socioeconomic History    Marital status: SINGLE     Spouse name: Not on file    Number of children: Not on file    Years of education: Not on file    Highest education level: Not on file   Occupational History    Not on file   Tobacco Use    Smoking status: Never Smoker    Smokeless tobacco: Never Used   Vaping Use    Vaping Use: Not on file   Substance and Sexual Activity    Alcohol use: No    Drug use: No    Sexual activity: Yes   Other Topics Concern    Not on file   Social History Narrative    Not on file     Social Determinants of Health     Financial Resource Strain:     Difficulty of Paying Living Expenses: Not on file   Food Insecurity:     Worried About Running Out of Food in the Last Year: Not on file    Felix of Food in the Last Year: Not on file   Transportation Needs:     Lack of Transportation (Medical): Not on file    Lack of Transportation (Non-Medical):  Not on file   Physical Activity:     Days of Exercise per Week: Not on file    Minutes of Exercise per Session: Not on file   Stress:     Feeling of Stress : Not on file   Social Connections:     Frequency of Communication with Friends and Family: Not on file    Frequency of Social Gatherings with Friends and Family: Not on file    Attends Worship Services: Not on file    Active Member of [address] [place] [address] or Organizations: Not on file    Attends Club or Organization Meetings: Not on file    Marital Status: Not on file   Intimate Partner Violence:     Fear of Current or Ex-Partner: Not on file    Emotionally Abused: Not on file    Physically Abused: Not on file    Sexually Abused: Not on file   Housing Stability:     Unable to Pay for Housing in the Last Year: Not on file    Number of [place] in the Last Year: Not on file    Unstable Housing in the Last Year: Not on file         ALLERGIES: Sulfa (sulfonamide antibiotics)    Review of Systems   All other systems reviewed and are negative. Vitals:    06/08/22 2111   BP: 124/89   Pulse: 90   Resp: 16   Temp: 98.9 °F (37.2 °C)   SpO2: 98%   Weight: 83.9 kg (185 lb)   Height: 5' 4\" (1.626 m)            Physical Exam  Vitals and nursing note reviewed. Exam conducted with a chaperone present. Constitutional:       Appearance: Normal appearance. HENT:      Nose: Nose normal.   Eyes:      General: No scleral icterus. Cardiovascular:      Rate and Rhythm: Normal rate. Chest:          Comments: Firm, tender nodule under skin, mobile, non circular, semicircular in feel. No overlying skin color change  Musculoskeletal:         General: No deformity. Skin:     General: Skin is warm. Capillary Refill: Capillary refill takes less than 2 seconds. Neurological:      Mental Status: She is alert and oriented to person, place, and time.    Psychiatric:         Mood and Affect: Mood normal.          MDM  Number of Diagnoses or Management Options        VITAL SIGNS:  Patient Vitals for the past 4 hrs: Temp Pulse Resp BP SpO2   06/08/22 2111 98.9 °F (37.2 °C) 90 16 124/89 98 %         LABS:  No results found for this or any previous visit (from the past 6 hour(s)). IMAGING:  No orders to display         Medications During Visit:  Medications - No data to display      DECISION MAKING:  Robbie Rae is a 46 y.o. female who comes in as above. Here, patient appears well. She describes a local area that is been there for a year that got acutely larger more painful this week. Concern for abscess versus infected cyst versus lymphadenopathy versus other type of mass. Will treat with antibiotics in the event that this is infectious. Patient has follow-up with her primary care doctor later in the week/early next week for which she can follow-up. At that point they can discuss follow-up with breast surgery versus dermatology versus plastic surgery versus no additional follow-up if it is resolved      IMPRESSION:  1. Chest wall mass        DISPOSITION:  Discharged      Current Discharge Medication List      START taking these medications    Details   doxycycline (VIBRA-TABS) 100 mg tablet Take 1 Tablet by mouth two (2) times a day for 7 days. Qty: 14 Tablet, Refills: 0  Start date: 6/8/2022, End date: 6/15/2022              Follow-up Information     Follow up With Specialties Details Why Contact Mele Salinas MD Family Medicine Go in 1 week  [facility]  Mail Stop [address] [phone]              The patient is asked to follow-up with their primary care provider in the next several days. They are to call tomorrow for an appointment. The patient is asked to return promptly for any increased concerns or worsening of symptoms. They can return to this emergency department or any other emergency department.       Procedures

## 2022-06-09 NOTE — ED TRIAGE NOTES
Pt arrives with co/o \"knot between the breast\". Patient states that it has been present for a while but pain and redness has increased since the last few days. Denies any insect bites.

## 2022-06-15 ENCOUNTER — OFFICE VISIT (OUTPATIENT)
Dept: PRIMARY CARE CLINIC | Age: 42
End: 2022-06-15
Payer: MEDICAID

## 2022-06-15 VITALS
DIASTOLIC BLOOD PRESSURE: 77 MMHG | HEIGHT: 64 IN | RESPIRATION RATE: 18 BRPM | HEART RATE: 81 BPM | WEIGHT: 186 LBS | BODY MASS INDEX: 31.76 KG/M2 | TEMPERATURE: 97.5 F | OXYGEN SATURATION: 99 % | SYSTOLIC BLOOD PRESSURE: 121 MMHG

## 2022-06-15 DIAGNOSIS — I10 ESSENTIAL HYPERTENSION: Primary | ICD-10-CM

## 2022-06-15 PROCEDURE — 99213 OFFICE O/P EST LOW 20 MIN: CPT | Performed by: NURSE PRACTITIONER

## 2022-06-15 RX ORDER — LOSARTAN POTASSIUM 100 MG/1
TABLET ORAL
Qty: 90 TABLET | Refills: 3 | Status: SHIPPED | OUTPATIENT
Start: 2022-06-15

## 2022-06-15 RX ORDER — AMLODIPINE BESYLATE 5 MG/1
5 TABLET ORAL DAILY
Qty: 90 TABLET | Refills: 3 | Status: SHIPPED | OUTPATIENT
Start: 2022-06-15

## 2022-06-15 NOTE — PROGRESS NOTES
HISTORY OF PRESENT ILLNESS  Umesh Wilkes is a 46 y.o. female presents for follow up on HTN. Hypertension: Patients hypertension is well controlled on regimen of losartan and amlodipine. Denies headaches, blurred vision or dizziness. Vitals:    06/15/22 1546   BP: 121/77   Pulse: 81   Resp: 18   Temp: 97.5 °F (36.4 °C)   TempSrc: Temporal   SpO2: 99%   Weight: 186 lb (84.4 kg)   Height: 5' 4\" (1.626 m)     Patient Active Problem List   Diagnosis Code    Hypothyroid E03.9    Hashimoto's disease E06.3    Secondary amenorrhea N91.1    Hirsutism L68.0    PCOS (polycystic ovarian syndrome) E28.2    Prediabetes R73.03    Hyperglycemia R73.9    Anxiety F41.9    Depressive disorder F32. A    Seizure ([address] R56.9    Essential hypertension I10    Weight loss advised Z78.9     Patient Active Problem List    Diagnosis Date Noted    Essential hypertension 2021    Weight loss advised 2021    Hyperglycemia 2017    Anxiety 2017    Depressive disorder 2017    Seizure ([address] 2017    Prediabetes 2014    PCOS (polycystic ovarian syndrome) 2014    Secondary amenorrhea 2013    Hirsutism 2013    Hashimoto's disease     Hypothyroid 2013     Current Outpatient Medications   Medication Sig Dispense Refill    losartan (COZAAR) 100 mg tablet TAKE 1 TABLET BY MOUTH EVERY DAY 90 Tablet 3    amLODIPine (NORVASC) 5 mg tablet Take 1 Tablet by mouth daily. 90 Tablet 3    Unithroid 150 mcg tablet Take 1 Tablet by mouth Daily (before breakfast).  90 Tablet 3     Allergies   Allergen Reactions    Sulfa (Sulfonamide Antibiotics) Unknown (comments)     \"Received at [facility] and made me sick\"     Past Medical History:   Diagnosis Date    Hashimoto's disease     Hypertension     Hypothyroid     PCOS (polycystic ovarian syndrome)     Seizure ([address] 2016    Seizures ([address]      Past Surgical History:   Procedure Laterality Date    HX  SECTION      x2    HX CHOLECYSTECTOMY  10/2018     Family History   Problem Relation Age of Onset    Diabetes Mother     Diabetes Sister     Diabetes Sister      Social History     Tobacco Use    Smoking status: Never Smoker    Smokeless tobacco: Never Used   Substance Use Topics    Alcohol use: No           Review of Systems   Constitutional: Negative for malaise/fatigue and weight loss. Eyes: Negative for blurred vision and double vision. Respiratory: Negative for shortness of breath. Cardiovascular: Negative for chest pain and palpitations. Neurological: Negative for dizziness, tingling, tremors and headaches. Psychiatric/Behavioral: The patient is not nervous/anxious and does not have insomnia. Physical Exam  Constitutional:       Appearance: Normal appearance. She is normal weight. HENT:      Head: Normocephalic. Eyes:      Extraocular Movements: Extraocular movements intact. Conjunctiva/sclera: Conjunctivae normal.      Pupils: Pupils are equal, round, and reactive to light. Cardiovascular:      Rate and Rhythm: Normal rate and regular rhythm. Pulses: Normal pulses. Heart sounds: Normal heart sounds. Pulmonary:      Effort: Pulmonary effort is normal.      Breath sounds: Normal breath sounds. Musculoskeletal:         General: Normal range of motion. Cervical back: Normal range of motion and neck supple. Skin:     General: Skin is warm and dry. Neurological:      General: No focal deficit present. Mental Status: She is alert and oriented to person, place, and time. Psychiatric:         Mood and Affect: Mood normal.           ASSESSMENT and PLAN  Diagnoses and all orders for this visit:    1. Essential hypertension  Comments:  well controlled on amlodipine and losartan.    Orders:  -     CBC WITH AUTOMATED DIFF  -     METABOLIC PANEL, COMPREHENSIVE  -     LIPID PANEL  -     losartan (COZAAR) 100 mg tablet; TAKE 1 TABLET BY MOUTH EVERY DAY  - amLODIPine (NORVASC) 5 mg tablet; Take 1 Tablet by mouth daily.          Abdias Velasco, NP

## 2022-06-15 NOTE — PROGRESS NOTES
Chief Complaint   Patient presents with    Follow Up Chronic Condition     pt states refills were sent pharmacy but is here to f/u on chronic condition     Visit Vitals  /77 (BP 1 Location: Right arm, BP Patient Position: At rest, BP Cuff Size: Adult)   Pulse 81   Temp 97.5 °F (36.4 °C) (Temporal)   Resp 18   Ht 5' 4\" (1.626 m)   Wt 186 lb (84.4 kg)   SpO2 99%   BMI 31.93 kg/m²         1. Have you been to the ER, urgent care clinic since your last visit? Hospitalized since your last visit?no    2. Have you seen or consulted any other health care providers outside of the [address] [place] [address] since your last visit? Include any pap smears or colon screening.  no

## 2022-06-18 LAB
ALBUMIN SERPL-MCNC: 4.2 G/DL (ref 3.8–4.8)
ALBUMIN/GLOB SERPL: 1.7 {RATIO} (ref 1.2–2.2)
ALP SERPL-CCNC: 71 IU/L (ref 44–121)
ALT SERPL-CCNC: 20 IU/L (ref 0–32)
AST SERPL-CCNC: 13 IU/L (ref 0–40)
BASOPHILS # BLD AUTO: 0.1 X10E3/UL (ref 0–0.2)
BASOPHILS NFR BLD AUTO: 1 %
BILIRUB SERPL-MCNC: 0.4 MG/DL (ref 0–1.2)
BUN SERPL-MCNC: 11 MG/DL (ref 6–20)
BUN/CREAT SERPL: 14 (ref 9–23)
CALCIUM SERPL-MCNC: 9 MG/DL (ref 8.7–10.2)
CHLORIDE SERPL-SCNC: 107 MMOL/L (ref 96–106)
CHOLEST SERPL-MCNC: 152 MG/DL (ref 100–199)
CO2 SERPL-SCNC: 25 MMOL/L (ref 20–29)
CREAT SERPL-MCNC: 0.81 MG/DL (ref 0.57–1)
EGFR: 95 ML/MIN/1.73
EOSINOPHIL # BLD AUTO: 0.4 X10E3/UL (ref 0–0.4)
EOSINOPHIL NFR BLD AUTO: 5 %
ERYTHROCYTE [DISTWIDTH] IN BLOOD BY AUTOMATED COUNT: 12.9 % (ref 11.7–15.4)
GLOBULIN SER CALC-MCNC: 2.5 G/DL (ref 1.5–4.5)
GLUCOSE SERPL-MCNC: 103 MG/DL (ref 65–99)
HCT VFR BLD AUTO: 41.4 % (ref 34–46.6)
HDLC SERPL-MCNC: 38 MG/DL
HGB BLD-MCNC: 14 G/DL (ref 11.1–15.9)
IMM GRANULOCYTES # BLD AUTO: 0 X10E3/UL (ref 0–0.1)
IMM GRANULOCYTES NFR BLD AUTO: 0 %
LDLC SERPL CALC-MCNC: 87 MG/DL (ref 0–99)
LYMPHOCYTES # BLD AUTO: 2.9 X10E3/UL (ref 0.7–3.1)
LYMPHOCYTES NFR BLD AUTO: 36 %
MCH RBC QN AUTO: 30.8 PG (ref 26.6–33)
MCHC RBC AUTO-ENTMCNC: 33.8 G/DL (ref 31.5–35.7)
MCV RBC AUTO: 91 FL (ref 79–97)
MONOCYTES # BLD AUTO: 0.5 X10E3/UL (ref 0.1–0.9)
MONOCYTES NFR BLD AUTO: 6 %
NEUTROPHILS # BLD AUTO: 4.3 X10E3/UL (ref 1.4–7)
NEUTROPHILS NFR BLD AUTO: 52 %
PLATELET # BLD AUTO: 286 X10E3/UL (ref 150–450)
POTASSIUM SERPL-SCNC: 4.4 MMOL/L (ref 3.5–5.2)
PROT SERPL-MCNC: 6.7 G/DL (ref 6–8.5)
RBC # BLD AUTO: 4.55 X10E6/UL (ref 3.77–5.28)
SODIUM SERPL-SCNC: 146 MMOL/L (ref 134–144)
TRIGL SERPL-MCNC: 155 MG/DL (ref 0–149)
VLDLC SERPL CALC-MCNC: 27 MG/DL (ref 5–40)
WBC # BLD AUTO: 8.1 X10E3/UL (ref 3.4–10.8)

## 2022-09-27 DIAGNOSIS — E06.3 HYPOTHYROIDISM DUE TO HASHIMOTO'S THYROIDITIS: ICD-10-CM

## 2022-09-27 DIAGNOSIS — E03.8 HYPOTHYROIDISM DUE TO HASHIMOTO'S THYROIDITIS: ICD-10-CM

## 2022-09-27 RX ORDER — LEVOTHYROXINE SODIUM 150 UG/1
TABLET ORAL
Qty: 90 TABLET | Refills: 3 | Status: SHIPPED | OUTPATIENT
Start: 2022-09-27

## 2023-03-14 ENCOUNTER — OFFICE VISIT (OUTPATIENT)
Dept: ENDOCRINOLOGY | Age: 43
End: 2023-03-14
Payer: MEDICAID

## 2023-03-14 VITALS
HEART RATE: 77 BPM | HEIGHT: 64 IN | OXYGEN SATURATION: 97 % | WEIGHT: 185 LBS | SYSTOLIC BLOOD PRESSURE: 127 MMHG | RESPIRATION RATE: 18 BRPM | BODY MASS INDEX: 31.58 KG/M2 | DIASTOLIC BLOOD PRESSURE: 76 MMHG | TEMPERATURE: 98.4 F

## 2023-03-14 DIAGNOSIS — E06.3 HYPOTHYROIDISM DUE TO HASHIMOTO'S THYROIDITIS: Primary | ICD-10-CM

## 2023-03-14 DIAGNOSIS — R73.03 PREDIABETES: ICD-10-CM

## 2023-03-14 DIAGNOSIS — E03.8 HYPOTHYROIDISM DUE TO HASHIMOTO'S THYROIDITIS: Primary | ICD-10-CM

## 2023-03-14 DIAGNOSIS — E28.2 PCOS (POLYCYSTIC OVARIAN SYNDROME): ICD-10-CM

## 2023-03-14 PROCEDURE — 3074F SYST BP LT 130 MM HG: CPT | Performed by: INTERNAL MEDICINE

## 2023-03-14 PROCEDURE — 3078F DIAST BP <80 MM HG: CPT | Performed by: INTERNAL MEDICINE

## 2023-03-14 PROCEDURE — 99214 OFFICE O/P EST MOD 30 MIN: CPT | Performed by: INTERNAL MEDICINE

## 2023-03-14 RX ORDER — PHENTERMINE HYDROCHLORIDE 37.5 MG/1
37.5 TABLET ORAL
Qty: 30 TABLET | Refills: 5 | Status: SHIPPED | OUTPATIENT
Start: 2023-03-14

## 2023-03-14 RX ORDER — LEVOTHYROXINE SODIUM 150 UG/1
TABLET ORAL
Qty: 90 TABLET | Refills: 3 | Status: SHIPPED | OUTPATIENT
Start: 2023-03-14

## 2023-03-14 RX ORDER — MEDROXYPROGESTERONE ACETATE 10 MG/1
TABLET ORAL
COMMUNITY
Start: 2022-12-20

## 2023-03-14 NOTE — PATIENT INSTRUCTIONS
Unithroid - Take 1 tab Mon-Sat and 1/2 tab on Sundays    We will begin a trial of weight loss medication to help with metabolism. Please watch out for chest pain, heart racing, dizziness, or anxiety and let me know if you develop any of these symptoms. Please monitor your blood pressure 1-2 times a week while on this and let me know if you are having any readings over 140/90. You can not be pregnant or get pregnant while you take the medication. If there is any possibility of pregnancy, a pregnancy test should be done to rule out before starting medication.

## 2023-03-14 NOTE — LETTER
3/14/2023    Patient: Eric Gomez   YOB: 1980   Date of Visit: 3/14/2023     Aurelia Sacks, NP  [place] [id number]  [facility] [address]    Dear Aurelia Sacks, NP,      Thank you for referring Ms. Anne Aj to [address] [place] [address] for evaluation. My notes for this consultation are attached. If you have questions, please do not hesitate to call me. I look forward to following your patient along with you.       Sincerely,    Sohan Lanier MD

## 2023-03-14 NOTE — PROGRESS NOTES
Jason Singh is a 38 y.o. female here for   Chief Complaint   Patient presents with    PCOS    Thyroid Problem       1. Have you been to the ER or an urgent care clinic since your last visit?  - no    2. Have you been hospitalized since your last visit? - no    3. Have you seen or consulted any other health care providers outside of the [address] [place] [address] since your last visit?   Include any pap smears or colon screening.- PCP

## 2023-03-14 NOTE — PROGRESS NOTES
Chief complaint: PCOS and hypothyroidism    History of present illness    Elma Haque is a 38 y.o. female  here for fu of  Hypothyroidism and PCOS    3/14/23  PCOS: Followed by [facility] - no cycles, takes Provera every 2 to 3 months having withdrawal cycle  No kidney stones  Not able to lose weight,     Diagnosed with Covid December 27th  She is on LT4 consistently , now on Unithroid  Could not tolerate immediate release Metformin,  Has underlying GERD      Prior history  She has seen ENT, for odynophagia, PPI helped,   Zantac did not help  Off OCPs because of left Ovarian vein thrombosis   Having cycles on her own, could not tolerate Metformin      No history of known radiation exposure    No FH of thyroid disease. No FH of thyroid cancer     Wt Readings from Last 3 Encounters:   03/14/23 185 lb (83.9 kg)   06/15/22 186 lb (84.4 kg)   06/08/22 185 lb (83.9 kg)       Past Medical History:   Diagnosis Date    Hashimoto's disease     Hypertension     Hypothyroid     PCOS (polycystic ovarian syndrome)     [address] [address] 12/2016    Seizures ([facility] [address]      Wt Readings from Last 3 Encounters:   03/14/23 185 lb (83.9 kg)   06/15/22 186 lb (84.4 kg)   06/08/22 185 lb (83.9 kg)     Current Outpatient Medications   Medication Sig    medroxyPROGESTERone (PROVERA) 10 mg tablet TAKE 1 TABLET BY MOUTH WITH FOOD ONCE A DAY FOR 12 DAYS    Unithroid 150 mcg tablet Take 1 tab Mon-Sat and 1/2 tab on Sundays    phentermine (ADIPEX-P) 37.5 mg tablet Take 1 Tablet by mouth every morning. Max Daily Amount: 37.5 mg.    losartan (COZAAR) 100 mg tablet TAKE 1 TABLET BY MOUTH EVERY DAY    amLODIPine (NORVASC) 5 mg tablet Take 1 Tablet by mouth daily. No current facility-administered medications for this visit. Review of Systems:  Per HPI      Physical Examination:  Blood pressure 127/76, pulse 77, temperature 98.4 °F (36.9 °C), temperature source Temporal, resp.  rate 18, height 5' 4\" (1.626 m), weight 185 lb (83.9 kg), SpO2 97 %. Body mass index is 31.76 kg/m². General: pleasant, no distress, good eye contact  HEENT: no exophthalmos, no periorbital edema, EOMI  Neck: No visible thyromegaly  RS: Normal respiratory effort  Musculoskeletal: no tremors  Neurological: alert and oriented  Psychiatric: normal mood and affect  Skin: Normal color    Data Reviewed:   1/13 TSH 21  Prolactin 14  FSH 5.5    Lab Results   Component Value Date/Time    TSH 0.118 (L) 03/07/2023 12:00 AM    T4, Free 1.12 09/13/2021 12:00 AM      Component      Latest Ref Rng 11/5/2013 9/9/2013   Luteinizing hormone        9.7   FSH        5.0   TSH      0.450 - 4.500 uIU/mL 3.660 0.189 (L)   Testosterone, Serum (Total)      10.0 - 55.0 ng/dL  44.0   Prolactin      4.8 - 23.3 ng/mL  9.8   Estradiol        46.0   DHEA Sulfate      98.8 - 340.0 ug/dL  129.2     Lab Results   Component Value Date/Time    Hemoglobin A1c 5.8 (H) 03/07/2023 12:00 AM       [x] Reviewed labs    Assessment/Plan:     1. Hypothyroidism due to Hashimoto's thyroiditis    2. Prediabetes    3. PCOS (polycystic ovarian syndrome)          GERD-seen ENT  PPI helped      Primary hypothyroidism /Hashimoto's thyroditis  Synthroid: Brand not covered by insurance  Unithroid 150 mcg, good Rx coupon  Lab Results   Component Value Date/Time    TSH 0.118 (L) 03/07/2023 12:00 AM     Unithroid 1 tablet daily, half a tablet Sunday, March 20 23    US - no nodules 7/14  On LT4 - compliance discussed      HTN -controlled    PCOS /Prediabetes-  Hx of DVT - no OCPs    Metformin - diarrhea, changed to ER reports headache   Off Aldactone  On Provera   No phentermine due to uncontrolled hypertension  Discussed about weight loss, intermittent fasting, it is difficult to get weekly GLP-1 agonist with Medicaid  Phentermine, side effects discussed      Hypertriglyceridemia - off tricor    Seizures - off keppra        Thank you for allowing me to participate in the care of this patient.     Stephanie Muhammad, MD      Patient verbalized understanding    Voice-recognition software was used to generate this report, which may result in some phonetic-based errors in the grammar and contents. Even though attempts were made to correct all the mistakes, some may have been missed and remained in the body of the report.

## 2023-04-23 DIAGNOSIS — E28.2 PCOS (POLYCYSTIC OVARIAN SYNDROME): ICD-10-CM

## 2023-04-23 DIAGNOSIS — E03.8 HYPOTHYROIDISM DUE TO HASHIMOTO'S THYROIDITIS: Primary | ICD-10-CM

## 2023-04-23 DIAGNOSIS — E06.3 HYPOTHYROIDISM DUE TO HASHIMOTO'S THYROIDITIS: Primary | ICD-10-CM

## 2023-04-23 DIAGNOSIS — R73.03 PREDIABETES: ICD-10-CM

## 2023-04-24 DIAGNOSIS — R73.03 PREDIABETES: ICD-10-CM

## 2023-04-24 DIAGNOSIS — E03.8 HYPOTHYROIDISM DUE TO HASHIMOTO'S THYROIDITIS: Primary | ICD-10-CM

## 2023-04-24 DIAGNOSIS — E28.2 PCOS (POLYCYSTIC OVARIAN SYNDROME): ICD-10-CM

## 2023-04-24 DIAGNOSIS — E06.3 HYPOTHYROIDISM DUE TO HASHIMOTO'S THYROIDITIS: Primary | ICD-10-CM

## 2023-05-22 RX ORDER — MEDROXYPROGESTERONE ACETATE 10 MG/1
TABLET ORAL
COMMUNITY
Start: 2022-12-20

## 2023-05-22 RX ORDER — LEVOTHYROXINE SODIUM 0.15 MG/1
TABLET ORAL
COMMUNITY
Start: 2023-03-14

## 2023-05-22 RX ORDER — PHENTERMINE HYDROCHLORIDE 37.5 MG/1
37.5 TABLET ORAL
COMMUNITY
Start: 2023-03-14

## 2023-05-22 RX ORDER — AMLODIPINE BESYLATE 5 MG/1
5 TABLET ORAL DAILY
COMMUNITY
Start: 2022-06-15

## 2023-05-22 RX ORDER — LOSARTAN POTASSIUM 100 MG/1
1 TABLET ORAL DAILY
COMMUNITY
Start: 2022-06-15

## 2023-08-16 DIAGNOSIS — I10 ESSENTIAL (PRIMARY) HYPERTENSION: ICD-10-CM

## 2023-08-16 RX ORDER — AMLODIPINE BESYLATE 5 MG/1
TABLET ORAL
Qty: 90 TABLET | Refills: 3 | OUTPATIENT
Start: 2023-08-16

## 2023-08-20 SDOH — ECONOMIC STABILITY: TRANSPORTATION INSECURITY
IN THE PAST 12 MONTHS, HAS LACK OF TRANSPORTATION KEPT YOU FROM MEETINGS, WORK, OR FROM GETTING THINGS NEEDED FOR DAILY LIVING?: NO

## 2023-08-20 SDOH — ECONOMIC STABILITY: FOOD INSECURITY: WITHIN THE PAST 12 MONTHS, YOU WORRIED THAT YOUR FOOD WOULD RUN OUT BEFORE YOU GOT MONEY TO BUY MORE.: NEVER TRUE

## 2023-08-20 SDOH — ECONOMIC STABILITY: FOOD INSECURITY: WITHIN THE PAST 12 MONTHS, THE FOOD YOU BOUGHT JUST DIDN'T LAST AND YOU DIDN'T HAVE MONEY TO GET MORE.: NEVER TRUE

## 2023-08-20 SDOH — ECONOMIC STABILITY: INCOME INSECURITY: HOW HARD IS IT FOR YOU TO PAY FOR THE VERY BASICS LIKE FOOD, HOUSING, MEDICAL CARE, AND HEATING?: NOT HARD AT ALL

## 2023-08-20 SDOH — ECONOMIC STABILITY: HOUSING INSECURITY
IN THE LAST 12 MONTHS, WAS THERE A TIME WHEN YOU DID NOT HAVE A STEADY PLACE TO SLEEP OR SLEPT IN A SHELTER (INCLUDING NOW)?: NO

## 2023-08-21 ENCOUNTER — OFFICE VISIT (OUTPATIENT)
Dept: PRIMARY CARE CLINIC | Facility: CLINIC | Age: 43
End: 2023-08-21
Payer: MEDICAID

## 2023-08-21 VITALS
HEART RATE: 94 BPM | BODY MASS INDEX: 29.4 KG/M2 | HEIGHT: 64 IN | SYSTOLIC BLOOD PRESSURE: 119 MMHG | RESPIRATION RATE: 16 BRPM | OXYGEN SATURATION: 98 % | WEIGHT: 172.2 LBS | TEMPERATURE: 97.7 F | DIASTOLIC BLOOD PRESSURE: 80 MMHG

## 2023-08-21 DIAGNOSIS — I10 ESSENTIAL (PRIMARY) HYPERTENSION: Primary | ICD-10-CM

## 2023-08-21 PROCEDURE — 99213 OFFICE O/P EST LOW 20 MIN: CPT | Performed by: NURSE PRACTITIONER

## 2023-08-21 PROCEDURE — 3074F SYST BP LT 130 MM HG: CPT | Performed by: NURSE PRACTITIONER

## 2023-08-21 PROCEDURE — 3079F DIAST BP 80-89 MM HG: CPT | Performed by: NURSE PRACTITIONER

## 2023-08-21 RX ORDER — AMLODIPINE BESYLATE 5 MG/1
5 TABLET ORAL DAILY
Qty: 90 TABLET | Refills: 3 | Status: SHIPPED | OUTPATIENT
Start: 2023-08-21

## 2023-08-21 RX ORDER — LOSARTAN POTASSIUM 100 MG/1
100 TABLET ORAL DAILY
Qty: 90 TABLET | Refills: 3 | Status: SHIPPED | OUTPATIENT
Start: 2023-08-21

## 2023-08-21 SDOH — ECONOMIC STABILITY: FOOD INSECURITY: WITHIN THE PAST 12 MONTHS, YOU WORRIED THAT YOUR FOOD WOULD RUN OUT BEFORE YOU GOT MONEY TO BUY MORE.: NEVER TRUE

## 2023-08-21 SDOH — ECONOMIC STABILITY: INCOME INSECURITY: HOW HARD IS IT FOR YOU TO PAY FOR THE VERY BASICS LIKE FOOD, HOUSING, MEDICAL CARE, AND HEATING?: NOT HARD AT ALL

## 2023-08-21 SDOH — ECONOMIC STABILITY: FOOD INSECURITY: WITHIN THE PAST 12 MONTHS, THE FOOD YOU BOUGHT JUST DIDN'T LAST AND YOU DIDN'T HAVE MONEY TO GET MORE.: NEVER TRUE

## 2023-08-21 ASSESSMENT — ENCOUNTER SYMPTOMS
CHEST TIGHTNESS: 0
SHORTNESS OF BREATH: 0

## 2023-08-22 LAB
ALBUMIN SERPL-MCNC: 4.2 G/DL (ref 3.9–4.9)
ALBUMIN/GLOB SERPL: 2 {RATIO} (ref 1.2–2.2)
ALP SERPL-CCNC: 61 IU/L (ref 44–121)
ALT SERPL-CCNC: 12 IU/L (ref 0–32)
AST SERPL-CCNC: 10 IU/L (ref 0–40)
BILIRUB SERPL-MCNC: 0.5 MG/DL (ref 0–1.2)
BUN SERPL-MCNC: 10 MG/DL (ref 6–24)
BUN/CREAT SERPL: 14 (ref 9–23)
CALCIUM SERPL-MCNC: 8.7 MG/DL (ref 8.7–10.2)
CHLORIDE SERPL-SCNC: 103 MMOL/L (ref 96–106)
CHOLEST SERPL-MCNC: 148 MG/DL (ref 100–199)
CO2 SERPL-SCNC: 24 MMOL/L (ref 20–29)
CREAT SERPL-MCNC: 0.69 MG/DL (ref 0.57–1)
EGFRCR SERPLBLD CKD-EPI 2021: 112 ML/MIN/1.73
ERYTHROCYTE [DISTWIDTH] IN BLOOD BY AUTOMATED COUNT: 12.2 % (ref 11.7–15.4)
GLOBULIN SER CALC-MCNC: 2.1 G/DL (ref 1.5–4.5)
GLUCOSE SERPL-MCNC: 86 MG/DL (ref 70–99)
HCT VFR BLD AUTO: 39.7 % (ref 34–46.6)
HDLC SERPL-MCNC: 40 MG/DL
HGB BLD-MCNC: 13.6 G/DL (ref 11.1–15.9)
LDLC SERPL CALC-MCNC: 85 MG/DL (ref 0–99)
MCH RBC QN AUTO: 30.7 PG (ref 26.6–33)
MCHC RBC AUTO-ENTMCNC: 34.3 G/DL (ref 31.5–35.7)
MCV RBC AUTO: 90 FL (ref 79–97)
PLATELET # BLD AUTO: 252 X10E3/UL (ref 150–450)
POTASSIUM SERPL-SCNC: 4.3 MMOL/L (ref 3.5–5.2)
PROT SERPL-MCNC: 6.3 G/DL (ref 6–8.5)
RBC # BLD AUTO: 4.43 X10E6/UL (ref 3.77–5.28)
SODIUM SERPL-SCNC: 140 MMOL/L (ref 134–144)
TRIGL SERPL-MCNC: 131 MG/DL (ref 0–149)
VLDLC SERPL CALC-MCNC: 23 MG/DL (ref 5–40)
WBC # BLD AUTO: 7.7 X10E3/UL (ref 3.4–10.8)

## 2023-09-06 ENCOUNTER — NURSE ONLY (OUTPATIENT)
Age: 43
End: 2023-09-06

## 2023-09-06 DIAGNOSIS — E28.2 PCOS (POLYCYSTIC OVARIAN SYNDROME): ICD-10-CM

## 2023-09-06 DIAGNOSIS — E03.8 HYPOTHYROIDISM DUE TO HASHIMOTO'S THYROIDITIS: ICD-10-CM

## 2023-09-06 DIAGNOSIS — R73.03 PREDIABETES: ICD-10-CM

## 2023-09-06 DIAGNOSIS — E06.3 HYPOTHYROIDISM DUE TO HASHIMOTO'S THYROIDITIS: ICD-10-CM

## 2023-09-07 LAB
EST. AVERAGE GLUCOSE BLD GHB EST-MCNC: 108 MG/DL
HBA1C MFR BLD: 5.4 % (ref 4–5.6)
T4 FREE SERPL-MCNC: 1.6 NG/DL (ref 0.8–1.5)
TSH SERPL DL<=0.05 MIU/L-ACNC: 0.12 UIU/ML (ref 0.36–3.74)

## 2023-09-11 DIAGNOSIS — I10 ESSENTIAL (PRIMARY) HYPERTENSION: ICD-10-CM

## 2023-09-12 RX ORDER — AMLODIPINE BESYLATE 5 MG/1
5 TABLET ORAL DAILY
Qty: 90 TABLET | Refills: 3 | Status: SHIPPED | OUTPATIENT
Start: 2023-09-12

## 2023-09-12 RX ORDER — LOSARTAN POTASSIUM 100 MG/1
100 TABLET ORAL DAILY
Qty: 90 TABLET | Refills: 3 | Status: SHIPPED | OUTPATIENT
Start: 2023-09-12

## 2023-09-13 ENCOUNTER — OFFICE VISIT (OUTPATIENT)
Age: 43
End: 2023-09-13
Payer: MEDICAID

## 2023-09-13 VITALS
RESPIRATION RATE: 20 BRPM | HEIGHT: 64 IN | SYSTOLIC BLOOD PRESSURE: 106 MMHG | OXYGEN SATURATION: 99 % | DIASTOLIC BLOOD PRESSURE: 76 MMHG | WEIGHT: 166.1 LBS | HEART RATE: 92 BPM | TEMPERATURE: 98.2 F | BODY MASS INDEX: 28.36 KG/M2

## 2023-09-13 DIAGNOSIS — R73.03 PREDIABETES: ICD-10-CM

## 2023-09-13 DIAGNOSIS — E66.9 NON MORBID OBESITY: ICD-10-CM

## 2023-09-13 DIAGNOSIS — E03.9 PRIMARY HYPOTHYROIDISM: Primary | ICD-10-CM

## 2023-09-13 PROCEDURE — 99214 OFFICE O/P EST MOD 30 MIN: CPT | Performed by: INTERNAL MEDICINE

## 2023-09-13 PROCEDURE — 3078F DIAST BP <80 MM HG: CPT | Performed by: INTERNAL MEDICINE

## 2023-09-13 PROCEDURE — 3074F SYST BP LT 130 MM HG: CPT | Performed by: INTERNAL MEDICINE

## 2023-09-13 RX ORDER — DOXYCYCLINE HYCLATE 100 MG/1
100 CAPSULE ORAL 2 TIMES DAILY
COMMUNITY
Start: 2023-09-07

## 2023-09-13 NOTE — PROGRESS NOTES
Naomi Richmond is a 38 y.o. female here for   Chief Complaint   Patient presents with    Thyroid Problem       1. Have you been to the ER, urgent care clinic since your last visit? Hospitalized since your last visit? - no    2. Have you seen or consulted any other health care providers outside of the [address] [place] [address] Avenue since your last visit?   Include any pap smears or colon screening.- [facility] Angela Ryan, Bronchitis

## 2023-09-13 NOTE — PROGRESS NOTES
Chief complaint: PCOS and hypothyroidism    History of present illness    Rock García is a 38 y.o. female  here for fu of  Hypothyroidism and PCOS    PCOS: Followed by [facility] - no cycles, takes Provera every 2 to 3 months having withdrawal cycle  Phentermine started in 2023, lost 20 pounds, cycles are regular without Provera now  Unithroid, was on levothyroxine in the past  Could not tolerate immediate release Metformin,  Has underlying GERD      Prior history  She has seen ENT, for odynophagia, PPI helped,   Zantac did not help  Off OCPs because of left Ovarian vein thrombosis   Having cycles on her own, could not tolerate Metformin      No history of known radiation exposure    No FH of thyroid disease. No FH of thyroid cancer       Physical Examination:    General: pleasant, no distress, good eye contact  HEENT: no exophthalmos, no periorbital edema, EOMI  Neck: No visible thyromegaly  RS: Normal respiratory effort  Musculoskeletal: no tremors  Neurological: alert and oriented  Psychiatric: normal mood and affect  Skin: Normal color    Data Reviewed:       Lab Results   Component Value Date    TSH 0.633 09/13/2023    MSY5MXA 0.12 (L) 09/06/2023    Z4ARYCT 11.1 09/13/2023    T4FREE 1.6 (H) 09/06/2023       No results found for: \"TRAB\", \"TSI\", \"TSILT\", \"TPO\"       [x] Reviewed labs    Assessment/Plan:     1. Hypothyroidism due to Hashimoto's thyroiditis    2. Prediabetes    3.  PCOS (polycystic ovarian syndrome)          GERD-seen ENT  PPI helped      Primary hypothyroidism /Hashimoto's thyroditis  Synthroid: Brand not covered by insurance  Unithroid 1 tablet daily,off Sunday, September 2023    US - no nodules 7/14  On LT4 - compliance discussed      HTN -controlled    PCOS /Prediabetes-  Hx of DVT - no OCPs    Metformin - diarrhea, changed to ER reports headache   Off Aldactone  Off Provera    Phentermine, no long term data      Hypertriglyceridemia - off tricor    Seizures - off

## 2023-09-14 LAB
T4 SERPL-MCNC: 11.1 UG/DL (ref 4.5–12)
TSH SERPL DL<=0.005 MIU/L-ACNC: 0.63 UIU/ML (ref 0.45–4.5)

## 2023-09-14 RX ORDER — LEVOTHYROXINE SODIUM 150 UG/1
TABLET ORAL
Qty: 30 TABLET | Refills: 11 | Status: SHIPPED | OUTPATIENT
Start: 2023-09-14

## 2023-09-14 RX ORDER — PHENTERMINE HYDROCHLORIDE 37.5 MG/1
37.5 TABLET ORAL
Qty: 30 TABLET | Refills: 5 | Status: SHIPPED | OUTPATIENT
Start: 2023-09-14 | End: 2024-03-14

## 2023-11-12 LAB
T4 SERPL-MCNC: 13.4 UG/DL (ref 4.5–12)
TSH SERPL DL<=0.005 MIU/L-ACNC: 0.56 UIU/ML (ref 0.45–4.5)

## 2023-11-15 ENCOUNTER — OFFICE VISIT (OUTPATIENT)
Age: 43
End: 2023-11-15
Payer: MEDICAID

## 2023-11-15 VITALS
HEIGHT: 64 IN | BODY MASS INDEX: 28.94 KG/M2 | HEART RATE: 103 BPM | WEIGHT: 169.5 LBS | RESPIRATION RATE: 18 BRPM | OXYGEN SATURATION: 100 % | SYSTOLIC BLOOD PRESSURE: 116 MMHG | TEMPERATURE: 97.7 F | DIASTOLIC BLOOD PRESSURE: 74 MMHG

## 2023-11-15 DIAGNOSIS — E06.3 HASHIMOTO'S DISEASE: ICD-10-CM

## 2023-11-15 DIAGNOSIS — E66.9 NON MORBID OBESITY: ICD-10-CM

## 2023-11-15 DIAGNOSIS — E03.9 PRIMARY HYPOTHYROIDISM: ICD-10-CM

## 2023-11-15 DIAGNOSIS — E03.9 ACQUIRED HYPOTHYROIDISM: Primary | ICD-10-CM

## 2023-11-15 DIAGNOSIS — E28.2 PCOS (POLYCYSTIC OVARIAN SYNDROME): ICD-10-CM

## 2023-11-15 PROCEDURE — 3078F DIAST BP <80 MM HG: CPT | Performed by: INTERNAL MEDICINE

## 2023-11-15 PROCEDURE — 3074F SYST BP LT 130 MM HG: CPT | Performed by: INTERNAL MEDICINE

## 2023-11-15 PROCEDURE — 99214 OFFICE O/P EST MOD 30 MIN: CPT | Performed by: INTERNAL MEDICINE

## 2023-11-15 RX ORDER — LEVOTHYROXINE SODIUM 150 UG/1
TABLET ORAL
Qty: 30 TABLET | Refills: 11 | Status: SHIPPED | OUTPATIENT
Start: 2023-11-15

## 2023-11-15 NOTE — PROGRESS NOTES
Gaye Newton is a 41 y.o. female here for   Chief Complaint   Patient presents with    Thyroid Problem     Hypothroidism       1. Have you been to the ER, urgent care clinic since your last visit? Hospitalized since your last visit? - no     2. Have you seen or consulted any other health care providers outside of the [address] [place] [address] Avenue since your last visit?   Include any pap smears or colon screening.- no

## 2024-03-03 DIAGNOSIS — E28.2 PCOS (POLYCYSTIC OVARIAN SYNDROME): ICD-10-CM

## 2024-03-03 DIAGNOSIS — E03.9 ACQUIRED HYPOTHYROIDISM: ICD-10-CM

## 2024-03-03 DIAGNOSIS — E06.3 HASHIMOTO'S DISEASE: ICD-10-CM

## 2024-03-09 LAB
BUN SERPL-MCNC: 11 MG/DL (ref 6–24)
BUN/CREAT SERPL: 15 (ref 9–23)
CALCIUM SERPL-MCNC: 8.9 MG/DL (ref 8.7–10.2)
CHLORIDE SERPL-SCNC: 103 MMOL/L (ref 96–106)
CO2 SERPL-SCNC: 23 MMOL/L (ref 20–29)
CREAT SERPL-MCNC: 0.73 MG/DL (ref 0.57–1)
EGFRCR SERPLBLD CKD-EPI 2021: 106 ML/MIN/1.73
GLUCOSE SERPL-MCNC: 104 MG/DL (ref 70–99)
POTASSIUM SERPL-SCNC: 3.5 MMOL/L (ref 3.5–5.2)
SODIUM SERPL-SCNC: 142 MMOL/L (ref 134–144)
T4 FREE SERPL-MCNC: 1.8 NG/DL (ref 0.82–1.77)
TSH SERPL DL<=0.005 MIU/L-ACNC: 0.69 UIU/ML (ref 0.45–4.5)

## 2024-03-15 ENCOUNTER — OFFICE VISIT (OUTPATIENT)
Age: 44
End: 2024-03-15
Payer: MEDICAID

## 2024-03-15 VITALS
DIASTOLIC BLOOD PRESSURE: 76 MMHG | SYSTOLIC BLOOD PRESSURE: 138 MMHG | HEIGHT: 64 IN | RESPIRATION RATE: 16 BRPM | OXYGEN SATURATION: 99 % | WEIGHT: 163 LBS | BODY MASS INDEX: 27.83 KG/M2 | HEART RATE: 86 BPM | TEMPERATURE: 98.9 F

## 2024-03-15 DIAGNOSIS — E28.2 PCOS (POLYCYSTIC OVARIAN SYNDROME): ICD-10-CM

## 2024-03-15 DIAGNOSIS — E06.3 HASHIMOTO'S DISEASE: Primary | ICD-10-CM

## 2024-03-15 DIAGNOSIS — E03.9 ACQUIRED HYPOTHYROIDISM: ICD-10-CM

## 2024-03-15 DIAGNOSIS — L68.0 HIRSUTISM: ICD-10-CM

## 2024-03-15 PROCEDURE — 3075F SYST BP GE 130 - 139MM HG: CPT | Performed by: INTERNAL MEDICINE

## 2024-03-15 PROCEDURE — 3078F DIAST BP <80 MM HG: CPT | Performed by: INTERNAL MEDICINE

## 2024-03-15 PROCEDURE — 99214 OFFICE O/P EST MOD 30 MIN: CPT | Performed by: INTERNAL MEDICINE

## 2024-03-15 NOTE — PROGRESS NOTES
keppra        Thank you for allowing me to participate in the care of this patient.    Iman Dickerson MD      Patient verbalized understanding    Voice-recognition software was used to generate this report, which may result in some phonetic-based errors in the grammar and contents.  Even though attempts were made to correct all the mistakes, some may have been missed and remained in the body of the report.

## 2024-03-21 DIAGNOSIS — E66.9 NON MORBID OBESITY: ICD-10-CM

## 2024-03-21 DIAGNOSIS — E03.9 PRIMARY HYPOTHYROIDISM: ICD-10-CM

## 2024-03-21 RX ORDER — LEVOTHYROXINE SODIUM 0.15 MG/1
TABLET ORAL
Qty: 90 TABLET | Refills: 3 | Status: SHIPPED | OUTPATIENT
Start: 2024-03-21

## 2024-08-06 LAB
T4 FREE SERPL-MCNC: 1.69 NG/DL (ref 0.82–1.77)
TSH SERPL DL<=0.005 MIU/L-ACNC: 0.2 UIU/ML (ref 0.45–4.5)

## 2024-09-20 ENCOUNTER — OFFICE VISIT (OUTPATIENT)
Age: 44
End: 2024-09-20

## 2024-09-20 VITALS
HEART RATE: 82 BPM | TEMPERATURE: 98 F | BODY MASS INDEX: 28.77 KG/M2 | OXYGEN SATURATION: 100 % | DIASTOLIC BLOOD PRESSURE: 86 MMHG | SYSTOLIC BLOOD PRESSURE: 128 MMHG | HEIGHT: 64 IN | WEIGHT: 168.5 LBS

## 2024-09-20 DIAGNOSIS — E28.2 PCOS (POLYCYSTIC OVARIAN SYNDROME): ICD-10-CM

## 2024-09-20 DIAGNOSIS — N91.1 AMENORRHEA, SECONDARY: ICD-10-CM

## 2024-09-20 DIAGNOSIS — E06.3 HASHIMOTO'S DISEASE: Primary | ICD-10-CM

## 2024-09-20 DIAGNOSIS — E03.9 ACQUIRED HYPOTHYROIDISM: ICD-10-CM

## 2024-09-21 LAB
ESTRADIOL SERPL-MCNC: 88.9 PG/ML
FSH SERPL-ACNC: 5.4 MIU/ML
HCG SERPL QL: NEGATIVE
LH SERPL-ACNC: 14.7 MIU/ML
PROLACTIN SERPL-MCNC: 4.9 NG/ML

## 2024-09-25 ENCOUNTER — TELEPHONE (OUTPATIENT)
Age: 44
End: 2024-09-25

## 2024-09-25 LAB — TESTOST SERPL-MCNC: 43.7 NG/DL

## 2024-09-25 RX ORDER — MEDROXYPROGESTERONE ACETATE 10 MG
10 TABLET ORAL DAILY
Qty: 10 TABLET | Refills: 0 | Status: SHIPPED | OUTPATIENT
Start: 2024-09-25 | End: 2024-10-05

## 2024-10-07 ENCOUNTER — OFFICE VISIT (OUTPATIENT)
Dept: PRIMARY CARE CLINIC | Facility: CLINIC | Age: 44
End: 2024-10-07
Payer: MEDICAID

## 2024-10-07 VITALS
HEART RATE: 82 BPM | WEIGHT: 172 LBS | TEMPERATURE: 98.4 F | SYSTOLIC BLOOD PRESSURE: 121 MMHG | HEIGHT: 64 IN | OXYGEN SATURATION: 98 % | DIASTOLIC BLOOD PRESSURE: 83 MMHG | BODY MASS INDEX: 29.37 KG/M2 | RESPIRATION RATE: 16 BRPM

## 2024-10-07 DIAGNOSIS — I10 ESSENTIAL HYPERTENSION: Chronic | ICD-10-CM

## 2024-10-07 DIAGNOSIS — Z00.00 ROUTINE PHYSICAL EXAMINATION: Primary | ICD-10-CM

## 2024-10-07 DIAGNOSIS — Z12.31 SCREENING MAMMOGRAM, ENCOUNTER FOR: ICD-10-CM

## 2024-10-07 DIAGNOSIS — R73.03 PREDIABETES: Chronic | ICD-10-CM

## 2024-10-07 PROCEDURE — 99396 PREV VISIT EST AGE 40-64: CPT | Performed by: NURSE PRACTITIONER

## 2024-10-07 PROCEDURE — 3074F SYST BP LT 130 MM HG: CPT | Performed by: NURSE PRACTITIONER

## 2024-10-07 PROCEDURE — 3079F DIAST BP 80-89 MM HG: CPT | Performed by: NURSE PRACTITIONER

## 2024-10-07 RX ORDER — LOSARTAN POTASSIUM 100 MG/1
100 TABLET ORAL DAILY
Qty: 90 TABLET | Refills: 3 | Status: SHIPPED | OUTPATIENT
Start: 2024-10-07

## 2024-10-07 SDOH — ECONOMIC STABILITY: FOOD INSECURITY: WITHIN THE PAST 12 MONTHS, YOU WORRIED THAT YOUR FOOD WOULD RUN OUT BEFORE YOU GOT MONEY TO BUY MORE.: NEVER TRUE

## 2024-10-07 SDOH — ECONOMIC STABILITY: FOOD INSECURITY: WITHIN THE PAST 12 MONTHS, THE FOOD YOU BOUGHT JUST DIDN'T LAST AND YOU DIDN'T HAVE MONEY TO GET MORE.: NEVER TRUE

## 2024-10-07 SDOH — ECONOMIC STABILITY: INCOME INSECURITY: HOW HARD IS IT FOR YOU TO PAY FOR THE VERY BASICS LIKE FOOD, HOUSING, MEDICAL CARE, AND HEATING?: NOT HARD AT ALL

## 2024-10-07 ASSESSMENT — PATIENT HEALTH QUESTIONNAIRE - PHQ9
3. TROUBLE FALLING OR STAYING ASLEEP: NOT AT ALL
SUM OF ALL RESPONSES TO PHQ QUESTIONS 1-9: 0
8. MOVING OR SPEAKING SO SLOWLY THAT OTHER PEOPLE COULD HAVE NOTICED. OR THE OPPOSITE, BEING SO FIGETY OR RESTLESS THAT YOU HAVE BEEN MOVING AROUND A LOT MORE THAN USUAL: NOT AT ALL
4. FEELING TIRED OR HAVING LITTLE ENERGY: NOT AT ALL
7. TROUBLE CONCENTRATING ON THINGS, SUCH AS READING THE NEWSPAPER OR WATCHING TELEVISION: NOT AT ALL
SUM OF ALL RESPONSES TO PHQ9 QUESTIONS 1 & 2: 0
1. LITTLE INTEREST OR PLEASURE IN DOING THINGS: NOT AT ALL
6. FEELING BAD ABOUT YOURSELF - OR THAT YOU ARE A FAILURE OR HAVE LET YOURSELF OR YOUR FAMILY DOWN: NOT AT ALL
SUM OF ALL RESPONSES TO PHQ QUESTIONS 1-9: 0
10. IF YOU CHECKED OFF ANY PROBLEMS, HOW DIFFICULT HAVE THESE PROBLEMS MADE IT FOR YOU TO DO YOUR WORK, TAKE CARE OF THINGS AT HOME, OR GET ALONG WITH OTHER PEOPLE: NOT DIFFICULT AT ALL
9. THOUGHTS THAT YOU WOULD BE BETTER OFF DEAD, OR OF HURTING YOURSELF: NOT AT ALL
2. FEELING DOWN, DEPRESSED OR HOPELESS: NOT AT ALL
5. POOR APPETITE OR OVEREATING: NOT AT ALL
SUM OF ALL RESPONSES TO PHQ QUESTIONS 1-9: 0
SUM OF ALL RESPONSES TO PHQ QUESTIONS 1-9: 0

## 2024-10-07 ASSESSMENT — ENCOUNTER SYMPTOMS
CHEST TIGHTNESS: 0
SHORTNESS OF BREATH: 0

## 2024-10-07 NOTE — PROGRESS NOTES
Kavitha Walls is a 43 y.o. female who was seen in clinic today (10/7/2024) for a physical    Assessment & Plan:   Below is the assessment and plan developed based on review of pertinent history, physical exam, labs, studies, and medications.    1. Routine physical examination  -     CBC  -     Comprehensive Metabolic Panel  2. Screening mammogram, encounter for  -     [facility] DIGITAL SCREEN BILATERAL; Future  3. Prediabetes  Comments:  will monitor a1c, has made lifestyle changes.  Orders:  -     Hemoglobin A1C  4. Essential hypertension  Comments:  stable on losartan  Orders:  -     Lipid Panel  -     losartan (COZAAR) 100 MG tablet; Take 1 tablet by mouth daily, Disp-90 tablet, R-3Normal      Return in about 1 year (around 10/7/2025) for Physical.    Subjective:   Kavitha was seen today for Annual Exam     PAP: scheduled in Jan with OBGYN.  [facility]  Mammo: due now    Specialist:  Eye: 2023, has glasses  Dentist: routine preventative visits    Diet: regular diet  Exercise: none  Occupation: works in retail (shoe store)    The following acute and/or chronic problems were also addressed today:    Hypertension: Patients hypertension is well controlled on regimen of losartan. Denies headaches, blurred vision or dizziness.      Follows with endocrinology for Hashimoto's.       Review of Systems   Constitutional:  Negative for fatigue.   Eyes:  Negative for visual disturbance.   Respiratory:  Negative for chest tightness and shortness of breath.    Cardiovascular:  Negative for chest pain, palpitations and leg swelling.   Allergic/Immunologic: Negative for environmental allergies.   Neurological:  Negative for dizziness, weakness, light-headedness and headaches.   Psychiatric/Behavioral:  Negative for sleep disturbance. The patient is not nervous/anxious.           Objective:     Vitals:    10/07/24 1339   BP: 121/83   Site: Right Upper Arm   Position: Sitting   Pulse: 82   Resp: 16   Temp: 98.4 °F

## 2024-10-07 NOTE — PROGRESS NOTES
Chief Complaint   Patient presents with    Annual Exam     /83 (Site: Right Upper Arm, Position: Sitting)   Pulse 82   Temp 98.4 °F (36.9 °C) (Oral)   Resp 16   Ht 1.626 m (5' 4\")   Wt 78 kg (172 lb)   SpO2 98%   BMI 29.52 kg/m²     \"Have you been to the ER, urgent care clinic since your last visit?  Hospitalized since your last visit?\"    NO    “Have you seen or consulted any other health care providers outside our system since your last visit?”    NO    Have you had a mammogram?”   NO    No breast cancer screening on file      “Have you had a pap smear?”    NO    No cervical cancer screening on file

## 2024-10-08 LAB
ALBUMIN SERPL-MCNC: 4.2 G/DL (ref 3.9–4.9)
ALP SERPL-CCNC: 59 IU/L (ref 44–121)
ALT SERPL-CCNC: 15 IU/L (ref 0–32)
AST SERPL-CCNC: 14 IU/L (ref 0–40)
BILIRUB SERPL-MCNC: 0.4 MG/DL (ref 0–1.2)
BUN SERPL-MCNC: 16 MG/DL (ref 6–24)
BUN/CREAT SERPL: 17 (ref 9–23)
CALCIUM SERPL-MCNC: 8.9 MG/DL (ref 8.7–10.2)
CHLORIDE SERPL-SCNC: 106 MMOL/L (ref 96–106)
CHOLEST SERPL-MCNC: 165 MG/DL (ref 100–199)
CO2 SERPL-SCNC: 23 MMOL/L (ref 20–29)
CREAT SERPL-MCNC: 0.96 MG/DL (ref 0.57–1)
EGFRCR SERPLBLD CKD-EPI 2021: 76 ML/MIN/1.73
ERYTHROCYTE [DISTWIDTH] IN BLOOD BY AUTOMATED COUNT: 12.8 % (ref 11.7–15.4)
GLOBULIN SER CALC-MCNC: 2.2 G/DL (ref 1.5–4.5)
GLUCOSE SERPL-MCNC: 102 MG/DL (ref 70–99)
HBA1C MFR BLD: 5.7 % (ref 4.8–5.6)
HCT VFR BLD AUTO: 41.7 % (ref 34–46.6)
HDLC SERPL-MCNC: 41 MG/DL
HGB BLD-MCNC: 13.5 G/DL (ref 11.1–15.9)
LDLC SERPL CALC-MCNC: 92 MG/DL (ref 0–99)
MCH RBC QN AUTO: 30.5 PG (ref 26.6–33)
MCHC RBC AUTO-ENTMCNC: 32.4 G/DL (ref 31.5–35.7)
MCV RBC AUTO: 94 FL (ref 79–97)
PLATELET # BLD AUTO: 233 X10E3/UL (ref 150–450)
POTASSIUM SERPL-SCNC: 4.2 MMOL/L (ref 3.5–5.2)
PROT SERPL-MCNC: 6.4 G/DL (ref 6–8.5)
RBC # BLD AUTO: 4.43 X10E6/UL (ref 3.77–5.28)
SODIUM SERPL-SCNC: 144 MMOL/L (ref 134–144)
TRIGL SERPL-MCNC: 187 MG/DL (ref 0–149)
VLDLC SERPL CALC-MCNC: 32 MG/DL (ref 5–40)
WBC # BLD AUTO: 8.2 X10E3/UL (ref 3.4–10.8)